# Patient Record
Sex: FEMALE | Race: WHITE | NOT HISPANIC OR LATINO | Employment: FULL TIME | URBAN - METROPOLITAN AREA
[De-identification: names, ages, dates, MRNs, and addresses within clinical notes are randomized per-mention and may not be internally consistent; named-entity substitution may affect disease eponyms.]

---

## 2019-10-24 LAB
CHOL/HDLC RATIO: 2.9
CHOLEST SERPL-MSCNC: 207 MG/DL (ref 0–200)
HDLC SERPL-MCNC: 71 MG/DL
HIV ANTIGEN/ANTIBODY: NON REACTIVE
LDLC SERPL CALC-MCNC: 119 MG/DL
NON-HDL CHOLESTEROL: 136
TRIGLYCERIDE (LIPID PAN): 73

## 2019-11-06 DIAGNOSIS — G43.909 MIGRAINES: Primary | ICD-10-CM

## 2020-06-08 ENCOUNTER — OFFICE VISIT (OUTPATIENT)
Dept: OBSTETRICS AND GYNECOLOGY | Facility: CLINIC | Age: 25
End: 2020-06-08
Attending: OBSTETRICS & GYNECOLOGY
Payer: COMMERCIAL

## 2020-06-08 VITALS
SYSTOLIC BLOOD PRESSURE: 100 MMHG | HEIGHT: 66 IN | DIASTOLIC BLOOD PRESSURE: 60 MMHG | BODY MASS INDEX: 28.34 KG/M2 | WEIGHT: 176.38 LBS

## 2020-06-08 DIAGNOSIS — Z01.419 WELL FEMALE EXAM WITH ROUTINE GYNECOLOGICAL EXAM: Primary | ICD-10-CM

## 2020-06-08 PROCEDURE — 99385 PR PREVENTIVE VISIT,NEW,18-39: ICD-10-PCS | Mod: S$GLB,,, | Performed by: OBSTETRICS & GYNECOLOGY

## 2020-06-08 PROCEDURE — 99999 PR PBB SHADOW E&M-EST. PATIENT-LVL III: ICD-10-PCS | Mod: PBBFAC,,, | Performed by: OBSTETRICS & GYNECOLOGY

## 2020-06-08 PROCEDURE — 99999 PR PBB SHADOW E&M-EST. PATIENT-LVL III: CPT | Mod: PBBFAC,,, | Performed by: OBSTETRICS & GYNECOLOGY

## 2020-06-08 PROCEDURE — 99385 PREV VISIT NEW AGE 18-39: CPT | Mod: S$GLB,,, | Performed by: OBSTETRICS & GYNECOLOGY

## 2020-06-08 PROCEDURE — 88175 CYTOPATH C/V AUTO FLUID REDO: CPT

## 2020-06-08 RX ORDER — ESCITALOPRAM OXALATE 20 MG/1
20 TABLET ORAL DAILY
Qty: 30 TABLET | Refills: 1 | Status: SHIPPED | OUTPATIENT
Start: 2020-06-08 | End: 2020-07-15 | Stop reason: SDUPTHER

## 2020-06-08 RX ORDER — ESCITALOPRAM OXALATE 10 MG/1
10 TABLET ORAL DAILY
COMMUNITY
End: 2020-06-08

## 2020-06-08 NOTE — PROGRESS NOTES
SUBJECTIVE:   24 y.o. female   for routine gyn exam. Patient's last menstrual period was 2020 (approximate)..  She has been on lexapro 10 mg for anxiety, but she would like to try a higher dose.         Past Medical History:   Diagnosis Date    Anxiety     Depression      Past Surgical History:   Procedure Laterality Date    NO PAST SURGERIES       Social History     Socioeconomic History    Marital status: Single     Spouse name: Not on file    Number of children: Not on file    Years of education: Not on file    Highest education level: Not on file   Occupational History    Not on file   Social Needs    Financial resource strain: Not on file    Food insecurity:     Worry: Not on file     Inability: Not on file    Transportation needs:     Medical: Not on file     Non-medical: Not on file   Tobacco Use    Smoking status: Never Smoker    Smokeless tobacco: Never Used   Substance and Sexual Activity    Alcohol use: Yes     Comment: social    Drug use: Never    Sexual activity: Not Currently     Birth control/protection: None   Lifestyle    Physical activity:     Days per week: Not on file     Minutes per session: Not on file    Stress: Not on file   Relationships    Social connections:     Talks on phone: Not on file     Gets together: Not on file     Attends Muslim service: Not on file     Active member of club or organization: Not on file     Attends meetings of clubs or organizations: Not on file     Relationship status: Not on file   Other Topics Concern    Not on file   Social History Narrative    Not on file     Family History   Problem Relation Age of Onset    Breast cancer Neg Hx     Colon cancer Neg Hx     Ovarian cancer Neg Hx      OB History    Para Term  AB Living   0 0 0 0 0 0   SAB TAB Ectopic Multiple Live Births   0 0 0 0 0         Current Outpatient Medications   Medication Sig Dispense Refill    escitalopram oxalate (LEXAPRO) 20 MG tablet Take 1  "tablet (20 mg total) by mouth once daily. 30 tablet 1     No current facility-administered medications for this visit.      Allergies: Amoxicillin; Keflex [cephalexin]; Pertussis vaccines; and Sulfa (sulfonamide antibiotics)     ROS:  Constitutional: no weight loss, weight gain, fever, fatigue  Eyes:  No vision changes, glasses/contacts  ENT/Mouth: No ulcers, sinus problems, ears ringing, headache  Cardiovascular: No inability to lie flat, chest pain, exercise intolerance, swelling, heart palpitations  Respiratory: No wheezing, coughing blood, shortness of breath, or cough  Gastrointestinal: No diarrhea, bloody stool, nausea/vomiting, constipation, gas, hemorrhoids  Genitourinary: No blood in urine, painful urination, urgency of urination, frequency of urination, incomplete emptying, incontinence, abnormal bleeding, painful periods, heavy periods, vaginal discharge, vaginal odor, painful intercourse, sexual problems, bleeding after intercourse.  Musculoskeletal: No muscle weakness  Skin/Breast: No painful breasts, nipple discharge, masses, rash, ulcers  Neurological: No passing out, seizures, numbness, headache  Endocrine: No diabetes, hypothyroid, hyperthyroid, hot flashes, hair loss, abnormal hair growth, acne  Psychiatric: No depression, crying  Hematologic: No bruises, bleeding, swollen lymph nodes, anemia.      OBJECTIVE:   The patient appears well, alert, oriented x 3, in no distress.  /60 (BP Location: Right arm, Patient Position: Sitting, BP Method: Medium (Manual))   Ht 5' 6" (1.676 m)   Wt 80 kg (176 lb 5.9 oz)   LMP 06/03/2020 (Approximate)   BMI 28.47 kg/m²   NECK: no thyromegaly, trachea midline  SKIN: no acne, striae, hirsutism  CHEST: CTAB  CV: RRR  BREAST EXAM: breasts appear normal, no suspicious masses, no skin or nipple changes or axillary nodes  ABDOMEN: no hernias, masses, or hepatosplenomegaly  GENITALIA: normal external genitalia, no erythema, no discharge  URETHRA: normal urethra, " normal urethral meatus  VAGINA: Normal  CERVIX: no lesions or cervical motion tenderness  UTERUS: normal size, contour, position, consistency, mobility, non-tender  ADNEXA: no mass, fullness, tenderness      ASSESSMENT:   1. Well female exam with routine gynecological exam  Liquid-Based Pap Smear, Screening       PLAN:   Orders Placed This Encounter    Liquid-Based Pap Smear, Screening    escitalopram oxalate (LEXAPRO) 20 MG tablet     Discussed healthy lifestyle including regular exercise, healthy eating, etc.  Return to clinic in 1 year

## 2020-06-12 LAB
FINAL PATHOLOGIC DIAGNOSIS: NORMAL
Lab: NORMAL

## 2020-07-15 ENCOUNTER — TELEPHONE (OUTPATIENT)
Dept: OBSTETRICS AND GYNECOLOGY | Facility: CLINIC | Age: 25
End: 2020-07-15

## 2020-07-15 RX ORDER — ESCITALOPRAM OXALATE 20 MG/1
20 TABLET ORAL DAILY
Qty: 90 TABLET | Refills: 1 | Status: SHIPPED | OUTPATIENT
Start: 2020-07-15 | End: 2020-07-16 | Stop reason: SDUPTHER

## 2020-07-15 NOTE — TELEPHONE ENCOUNTER
----- Message from Nahomi Roy MA sent at 7/15/2020  4:49 PM CDT -----  Patient request additional Lexapro refills until she can see her PCC  01/8/2021    Nahomi  ----- Message -----  From: Rancho Singer  Sent: 7/15/2020   4:35 PM CDT  To: Horace MARKS Staff    Patient is calling in to speak w/ staff on behalf of a refill patient contact number [phone]

## 2020-07-16 ENCOUNTER — OFFICE VISIT (OUTPATIENT)
Dept: FAMILY MEDICINE | Facility: CLINIC | Age: 25
End: 2020-07-16
Payer: COMMERCIAL

## 2020-07-16 DIAGNOSIS — G43.009 MIGRAINE WITHOUT AURA AND WITHOUT STATUS MIGRAINOSUS, NOT INTRACTABLE: ICD-10-CM

## 2020-07-16 DIAGNOSIS — F41.9 ANXIETY AND DEPRESSION: Primary | ICD-10-CM

## 2020-07-16 DIAGNOSIS — F32.A ANXIETY AND DEPRESSION: Primary | ICD-10-CM

## 2020-07-16 PROCEDURE — 99203 PR OFFICE/OUTPT VISIT, NEW, LEVL III, 30-44 MIN: ICD-10-PCS | Mod: 95,,, | Performed by: INTERNAL MEDICINE

## 2020-07-16 PROCEDURE — 99203 OFFICE O/P NEW LOW 30 MIN: CPT | Mod: 95,,, | Performed by: INTERNAL MEDICINE

## 2020-07-16 RX ORDER — ESCITALOPRAM OXALATE 20 MG/1
20 TABLET ORAL DAILY
Qty: 90 TABLET | Refills: 3 | Status: SHIPPED | OUTPATIENT
Start: 2020-07-16 | End: 2021-07-30

## 2020-07-16 RX ORDER — SUMATRIPTAN 50 MG/1
TABLET, FILM COATED ORAL
Qty: 15 TABLET | Refills: 0 | Status: SHIPPED | OUTPATIENT
Start: 2020-07-16 | End: 2020-08-04

## 2020-07-16 NOTE — PROGRESS NOTES
The patient location is: [place]  The chief complaint leading to consultation is: anxiety    Visit type: audiovisual    Face to Face time with patient: 10  10 minutes of total time spent on the encounter, which includes face to face time and non-face to face time preparing to see the patient (eg, review of tests), Obtaining and/or reviewing separately obtained history, Documenting clinical information in the electronic or other health record, Independently interpreting results (not separately reported) and communicating results to the patient/family/caregiver, or Care coordination (not separately reported).         Each patient to whom he or she provides medical services by telemedicine is:  (1) informed of the relationship between the physician and patient and the respective role of any other health care provider with respect to management of the patient; and (2) notified that he or she may decline to receive medical services by telemedicine and may withdraw from such care at any time.    Notes:     [facility] Note    Chief Complaint    No chief complaint on file.    History of Present Illness      Anabel Frost is a 24 y.o. female who presents today for chief complaint est care and med refills. Patient is new to me.    PCP: Alberto Lilly MD  Patient comes to appointment telemed.   Sees gyn Alicia Vu   Active Problem List with Overview Notes    Diagnosis Date Noted    Migraine without aura and without status migrainosus, not intractable 07/16/2020     Usually monthly, photophobia and nausea   imitrex is abortive       Anxiety and depression 07/16/2020     Has been on ssri for 3 years, recently increased to 20 mg, better controlled          Answers for HPI/ROS submitted by the patient on 7/16/2020   activity change: No  unexpected weight change: No  neck pain: No  hearing loss: No  rhinorrhea: No  trouble swallowing: No  eye discharge: No  visual disturbance: No  chest tightness:  No  wheezing: No  chest pain: No  palpitations: No  blood in stool: No  constipation: No  vomiting: No  diarrhea: No  polydipsia: No  polyuria: No  difficulty urinating: No  hematuria: No  menstrual problem: No  dysuria: No  joint swelling: No  arthralgias: No  headaches: No  weakness: No  confusion: No  dysphoric mood: No  activity change: No  unexpected weight change: No  rhinorrhea: No  trouble swallowing: No  visual disturbance: No  chest tightness: No  polyuria: No  difficulty urinating: No  menstrual problem: No  joint swelling: No  arthralgias: No  confusion: No  dysphoric mood: No       Health Maintenance   Topic Date Due    Lipid Panel  1995    HPV Vaccines (1 - 2-dose series) 11/28/2006    TETANUS VACCINE  11/28/2013    Pap Smear  06/08/2023       Past Medical History:   Diagnosis Date    Anxiety     Depression        Past Surgical History:   Procedure Laterality Date    NO PAST SURGERIES         family history is not on file.    Social History     Tobacco Use    Smoking status: Never Smoker    Smokeless tobacco: Never Used   Substance Use Topics    Alcohol use: Yes     Comment: social    Drug use: Never       Review of Systems   Constitutional: Negative for chills and fever.   HENT: Negative for congestion, hearing loss and sore throat.    Eyes: Negative for blurred vision and discharge.   Respiratory: Negative for cough, shortness of breath and wheezing.    Cardiovascular: Negative for chest pain and palpitations.   Gastrointestinal: Negative for blood in stool, constipation, diarrhea, nausea and vomiting.   Genitourinary: Negative for dysuria and hematuria.   Musculoskeletal: Negative for falls, myalgias and neck pain.   Skin: Negative for itching and rash.   Neurological: Negative for dizziness, weakness and headaches.   Endo/Heme/Allergies: Negative for polydipsia.        Outpatient Encounter Medications as of 7/16/2020   Medication Sig Dispense Refill    escitalopram oxalate  (LEXAPRO) 20 MG tablet Take 1 tablet (20 mg total) by mouth once daily. 90 tablet 3    sumatriptan (IMITREX) 50 MG tablet 1 po at start of headache may refill after 2 hours 15 tablet 0    [DISCONTINUED] escitalopram oxalate (LEXAPRO) 20 MG tablet Take 1 tablet (20 mg total) by mouth once daily. 90 tablet 1     No facility-administered encounter medications on file as of 7/16/2020.         Review of patient's allergies indicates:   Allergen Reactions    Amoxicillin Other (See Comments)    Keflex [cephalexin] Other (See Comments)    Pertussis vaccines Other (See Comments)    Sulfa (sulfonamide antibiotics) Other (See Comments)           Physical Exam       ]    Physical Exam  Vitals signs reviewed.   Constitutional:       General: She is not in acute distress.     Appearance: She is well-developed. She is not diaphoretic.   HENT:      Head: Normocephalic and atraumatic.      Right Ear: External ear normal.      Left Ear: External ear normal.      Nose: Nose normal.   Eyes:      General:         Right eye: No discharge.         Left eye: No discharge.      Conjunctiva/sclera: Conjunctivae normal.   Neck:      Musculoskeletal: Normal range of motion.   Pulmonary:      Effort: Pulmonary effort is normal. No respiratory distress.   Musculoskeletal: Normal range of motion.   Skin:     Coloration: Skin is not pale.      Findings: No rash.   Neurological:      Mental Status: She is alert and oriented to person, place, and time.   Psychiatric:         Behavior: Behavior normal.         Thought Content: Thought content normal.         Judgment: Judgment normal.          Laboratory:  CBC:  No results for input(s): WBC, RBC, HGB, HCT, PLT, MCV, MCH, MCHC in the last 2160 hours.  CMP:  No results for input(s): GLU, CALCIUM, ALBUMIN, PROT, NA, K, CO2, CL, BUN, ALKPHOS, ALT, AST, BILITOT in the last 2160 hours.    Invalid input(s): CREATININ  URINALYSIS:  No results for input(s): COLORU, CLARITYU, SPECGRAV, PHUR, PROTEINUA,  GLUCOSEU, BILIRUBINCON, BLOODU, WBCU, RBCU, BACTERIA, MUCUS, NITRITE, LEUKOCYTESUR, UROBILINOGEN, HYALINECASTS in the last 2160 hours.   LIPIDS:  No results for input(s): TSH, HDL, CHOL, TRIG, LDLCALC, CHOLHDL, NONHDLCHOL, TOTALCHOLEST in the last 2160 hours.  TSH:  No results for input(s): TSH in the last 2160 hours.  A1C:  No results for input(s): HGBA1C in the last 2160 hours.    Radiology:      Assessment/Plan     Anabel Frost is a 24 y.o.female with:    Migraine without aura and without status migrainosus, not intractable  Refill     Anxiety and depression  Refill lexapro       No orders of the defined types were placed in this encounter.      -Continue current medications and maintain follow up with specialists.  Return to clinic in as scheduled months.  Future Appointments   Date Time Provider Department Center   1/8/2021 11:00 AM Lady Pitt MD [facility]       Romina Juares MD  7/16/2020 3:57 PM    Primary Care Internal Medicine - [facility]                Answers for HPI/ROS submitted by the patient on 7/16/2020

## 2020-09-24 ENCOUNTER — PATIENT OUTREACH (OUTPATIENT)
Dept: ADMINISTRATIVE | Facility: HOSPITAL | Age: 25
End: 2020-09-24

## 2020-09-24 ENCOUNTER — TELEPHONE (OUTPATIENT)
Dept: ADMINISTRATIVE | Facility: HOSPITAL | Age: 25
End: 2020-09-24

## 2020-10-09 DIAGNOSIS — G43.009 MIGRAINE WITHOUT AURA AND WITHOUT STATUS MIGRAINOSUS, NOT INTRACTABLE: ICD-10-CM

## 2020-10-09 RX ORDER — SUMATRIPTAN 50 MG/1
TABLET, FILM COATED ORAL
Qty: 9 TABLET | Refills: 1 | Status: SHIPPED | OUTPATIENT
Start: 2020-10-09 | End: 2021-01-31 | Stop reason: SDUPTHER

## 2020-10-15 ENCOUNTER — OFFICE VISIT (OUTPATIENT)
Dept: FAMILY MEDICINE | Facility: CLINIC | Age: 25
End: 2020-10-15
Payer: COMMERCIAL

## 2020-10-15 VITALS
HEART RATE: 92 BPM | WEIGHT: 186.19 LBS | HEIGHT: 66 IN | DIASTOLIC BLOOD PRESSURE: 82 MMHG | TEMPERATURE: 98 F | OXYGEN SATURATION: 97 % | BODY MASS INDEX: 29.92 KG/M2 | SYSTOLIC BLOOD PRESSURE: 124 MMHG | RESPIRATION RATE: 16 BRPM

## 2020-10-15 DIAGNOSIS — E01.0 THYROMEGALY: ICD-10-CM

## 2020-10-15 DIAGNOSIS — F32.A ANXIETY AND DEPRESSION: ICD-10-CM

## 2020-10-15 DIAGNOSIS — F41.9 ANXIETY AND DEPRESSION: ICD-10-CM

## 2020-10-15 DIAGNOSIS — Z11.59 ENCOUNTER FOR HEPATITIS C SCREENING TEST FOR LOW RISK PATIENT: Primary | ICD-10-CM

## 2020-10-15 DIAGNOSIS — R53.83 FATIGUE, UNSPECIFIED TYPE: ICD-10-CM

## 2020-10-15 DIAGNOSIS — G47.33 OBSTRUCTIVE SLEEP APNEA: ICD-10-CM

## 2020-10-15 PROCEDURE — 99214 OFFICE O/P EST MOD 30 MIN: CPT | Mod: S$GLB,,, | Performed by: INTERNAL MEDICINE

## 2020-10-15 PROCEDURE — 3008F PR BODY MASS INDEX (BMI) DOCUMENTED: ICD-10-PCS | Mod: CPTII,S$GLB,, | Performed by: INTERNAL MEDICINE

## 2020-10-15 PROCEDURE — 3008F BODY MASS INDEX DOCD: CPT | Mod: CPTII,S$GLB,, | Performed by: INTERNAL MEDICINE

## 2020-10-15 PROCEDURE — 99214 PR OFFICE/OUTPT VISIT, EST, LEVL IV, 30-39 MIN: ICD-10-PCS | Mod: S$GLB,,, | Performed by: INTERNAL MEDICINE

## 2020-10-15 PROCEDURE — 99999 PR PBB SHADOW E&M-EST. PATIENT-LVL III: ICD-10-PCS | Mod: PBBFAC,,, | Performed by: INTERNAL MEDICINE

## 2020-10-15 PROCEDURE — 99999 PR PBB SHADOW E&M-EST. PATIENT-LVL III: CPT | Mod: PBBFAC,,, | Performed by: INTERNAL MEDICINE

## 2020-10-15 RX ORDER — ESCITALOPRAM OXALATE 10 MG/1
TABLET ORAL
COMMUNITY
End: 2020-10-15

## 2020-10-15 NOTE — PROGRESS NOTES
"[facility] Note    Chief Complaint      Chief Complaint   Patient presents with    Follow-up     History of Present Illness      Anabel Frost is a 24 y.o. female who presents today for chief complaint follow up anxiety .      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 No Le edema, no exertional dyspnea   Feels like mood is better on increased dose prozac but just feeling really tired and run down, having irregular periods,   Active Problem List with Overview Notes    Diagnosis Date Noted    Fatigue 10/15/2020    Obstructive sleep apnea 10/15/2020    Migraine without aura and without status migrainosus, not intractable 07/16/2020     Usually monthly, photophobia and nausea   imitrex is abortive       Anxiety and depression 07/16/2020     Has been on ssri for 3 years, recently increased to 20 mg, better controlled          Health Maintenance   Topic Date Due    Hepatitis C Screening  1995    HPV Vaccines (1 - 2-dose series) 11/28/2006    TETANUS VACCINE  11/28/2013    Pap Smear  06/08/2023    Lipid Panel  Completed       Past Medical History:   Diagnosis Date    Anxiety     Depression        Past Surgical History:   Procedure Laterality Date    NO PAST SURGERIES         family history is not on file.    Social History     Tobacco Use    Smoking status: Never Smoker    Smokeless tobacco: Never Used   Substance Use Topics    Alcohol use: Yes     Frequency: Monthly or less     Drinks per session: 1 or 2     Binge frequency: Less than monthly     Comment: social    Drug use: Never       Review of Systems   Constitutional: Positive for malaise/fatigue. Negative for chills and fever.   HENT: " "Negative for congestion and sore throat.    Eyes: Negative for blurred vision and discharge.   Respiratory: Negative for cough and shortness of breath.    Cardiovascular: Negative for chest pain, palpitations, orthopnea and leg swelling.   Gastrointestinal: Negative for constipation, diarrhea, nausea and vomiting.   Genitourinary: Negative for dysuria and hematuria.   Musculoskeletal: Negative for falls and myalgias.   Skin: Negative for itching and rash.   Neurological: Negative for dizziness and headaches.        Outpatient Encounter Medications as of 10/15/2020   Medication Sig Dispense Refill    escitalopram oxalate (LEXAPRO) 20 MG tablet Take 1 tablet (20 mg total) by mouth once daily. 90 tablet 3    sumatriptan (IMITREX) 50 MG tablet TAKE 1 TABLET BY MOUTH AT START OF HEADACHE MAY REPEAT AFTER 2 HOURS 9 tablet 1    [DISCONTINUED] escitalopram oxalate (LEXAPRO) 10 MG tablet        No facility-administered encounter medications on file as of 10/15/2020.         Review of patient's allergies indicates:   Allergen Reactions    Amoxicillin Other (See Comments)    Keflex [cephalexin] Other (See Comments)    Pertussis vaccines Other (See Comments)    Sulfa (sulfonamide antibiotics) Other (See Comments)           Physical Exam      Vital Signs  Temp: 98.1 °F (36.7 °C)  Temp src: Oral  Pulse: 92  Resp: 16  SpO2: 97 %  BP: (!) 120/90  BP Location: Right arm  Patient Position: Sitting  Pain Score: 0-No pain  Height and Weight  Height: 5' 6" (167.6 cm)  Weight: 84.5 kg (186 lb 2.9 oz)  BSA (Calculated - sq m): 1.98 sq meters  BMI (Calculated): 30.1  Weight in (lb) to have BMI = 25: 154.6]    Physical Exam  Vitals signs reviewed.   Constitutional:       Appearance: She is well-developed.   HENT:      Head: Normocephalic and atraumatic.      Right Ear: External ear normal.      Left Ear: External ear normal.   Eyes:      General:         Right eye: No discharge.         Left eye: No discharge.   Neck:      " Musculoskeletal: Normal range of motion.      Thyroid: Thyromegaly present.   Cardiovascular:      Rate and Rhythm: Normal rate and regular rhythm.      Heart sounds: Normal heart sounds. No murmur.   Pulmonary:      Effort: Pulmonary effort is normal. No respiratory distress.      Breath sounds: Normal breath sounds.   Abdominal:      General: Bowel sounds are normal. There is no distension.      Palpations: Abdomen is soft.      Tenderness: There is no abdominal tenderness.   Musculoskeletal: Normal range of motion.         General: No deformity.   Lymphadenopathy:      Cervical: No cervical adenopathy.   Skin:     General: Skin is warm and dry.      Findings: No rash.   Neurological:      Mental Status: She is alert and oriented to person, place, and time.   Psychiatric:         Behavior: Behavior normal.              Assessment/Plan     Anabel Frost is a 24 y.o.female with:    No problem-specific Assessment & Plan notes found for this encounter.      Orders Placed This Encounter   Procedures    US Soft Tissue Head Neck Thyroid     Standing Status:   Future     Standing Expiration Date:   10/15/2021     Order Specific Question:   May the Radiologist modify the order per protocol to meet the clinical needs of the patient?     Answer:   Yes    CBC auto differential     Standing Status:   Future     Standing Expiration Date:   12/14/2021    Comprehensive Metabolic Panel     Standing Status:   Future     Standing Expiration Date:   12/14/2021    T4, Free     Standing Status:   Future     Standing Expiration Date:   12/15/2021    TSH     Standing Status:   Future     Standing Expiration Date:   12/15/2021    Iron and TIBC     Standing Status:   Future     Standing Expiration Date:   12/14/2021    Ferritin     Standing Status:   Future     Standing Expiration Date:   12/14/2021    Hepatitis C Antibody     Standing Status:   Future     Standing Expiration Date:   12/14/2021    Polysomnography 4 or more  parameters     Standing Status:   Future     Standing Expiration Date:   10/15/2021       Use of the [facility] Patient Portal discussed and encouraged during today's visit  -Continue current medications and maintain follow up with specialists.  Return to clinic in 6 weeks  No future appointments.    Romina Juares MD  10/15/2020 1:17 PM    Primary Care Internal Medicine - [facility]

## 2020-10-17 ENCOUNTER — PATIENT MESSAGE (OUTPATIENT)
Dept: FAMILY MEDICINE | Facility: CLINIC | Age: 25
End: 2020-10-17

## 2020-10-17 DIAGNOSIS — D50.0 IRON DEFICIENCY ANEMIA DUE TO CHRONIC BLOOD LOSS: Primary | ICD-10-CM

## 2020-10-19 ENCOUNTER — TELEPHONE (OUTPATIENT)
Dept: INTERNAL MEDICINE | Facility: CLINIC | Age: 25
End: 2020-10-19

## 2020-10-20 ENCOUNTER — PATIENT MESSAGE (OUTPATIENT)
Dept: FAMILY MEDICINE | Facility: CLINIC | Age: 25
End: 2020-10-20

## 2020-10-20 ENCOUNTER — TELEPHONE (OUTPATIENT)
Dept: FAMILY MEDICINE | Facility: CLINIC | Age: 25
End: 2020-10-20

## 2020-10-20 ENCOUNTER — PATIENT OUTREACH (OUTPATIENT)
Dept: ADMINISTRATIVE | Facility: OTHER | Age: 25
End: 2020-10-20

## 2020-10-20 NOTE — TELEPHONE ENCOUNTER
----- Message from Mauricio Swain sent at 10/20/2020  3:46 PM CDT -----  Type:  Patient Returning Call    Who Called: Anabel  Who Left Message for Patient: Lexie  Does the patient know what this is regarding?: schedule hem/onc  Would the patient rather a call back or a response via [facility]? Call back  Best Call Back Number: [phone]  Additional Information: none

## 2020-10-21 ENCOUNTER — PATIENT MESSAGE (OUTPATIENT)
Dept: OBSTETRICS AND GYNECOLOGY | Facility: CLINIC | Age: 25
End: 2020-10-21

## 2020-10-21 NOTE — PROGRESS NOTES
Health Maintenance Due   Topic Date Due    HPV Vaccines (1 - 2-dose series) 11/28/2006    TETANUS VACCINE  11/28/2013     Updates were requested from care everywhere.  Chart was reviewed for overdue Proactive [facility] Encounters ([facility]) topics (CRS, Breast Cancer Screening, Eye exam)  Health Maintenance has been updated.  LINKS immunization registry triggered.  Immunizations were reconciled.  LINKS not responding

## 2020-10-21 NOTE — PROGRESS NOTES
Health Maintenance Due   Topic Date Due    HPV Vaccines (1 - 2-dose series) 11/28/2006    TETANUS VACCINE  11/28/2013     Updates were requested from care everywhere.  Chart was reviewed for overdue [facility]active [facility] Encounters ([facility]) topics (CRS, Breast Cancer Screening, Eye exam)  Health Maintenance has been updated.  LINKS immunization registry triggered.  Immunizations were reconciled.'  LINKS is not responding

## 2020-10-22 ENCOUNTER — OFFICE VISIT (OUTPATIENT)
Dept: SLEEP MEDICINE | Facility: CLINIC | Age: 25
End: 2020-10-22
Payer: COMMERCIAL

## 2020-10-22 ENCOUNTER — PATIENT MESSAGE (OUTPATIENT)
Dept: FAMILY MEDICINE | Facility: CLINIC | Age: 25
End: 2020-10-22

## 2020-10-22 DIAGNOSIS — G47.33 OSA (OBSTRUCTIVE SLEEP APNEA): Primary | ICD-10-CM

## 2020-10-22 PROCEDURE — 99202 PR OFFICE/OUTPT VISIT, NEW, LEVL II, 15-29 MIN: ICD-10-PCS | Mod: 95,,, | Performed by: INTERNAL MEDICINE

## 2020-10-22 PROCEDURE — 99202 OFFICE O/P NEW SF 15 MIN: CPT | Mod: 95,,, | Performed by: INTERNAL MEDICINE

## 2020-10-22 NOTE — LETTER
October 22, 2020      Romina Juares MD  [id number] [facility] [address]           [id number]  [address]  [facility] [address]  Phone: [phone]          Patient: Anabel Frost   MR Number: [id number]   YOB: 1995   Date of Visit: 10/22/2020       Dear Dr. Romina Juares:    Thank you for referring Anabel Frost to me for evaluation. Attached you will find relevant portions of my assessment and plan of care.    If you have questions, please do not hesitate to call me. I look forward to following Anabel Frost along with you.    Sincerely,    Kristy Mckeon MD    Enclosure  CC:  No Recipients    If you would like to receive this communication electronically, please contact [email or web address] or [phone] to request more information on [employer] Link access.    For providers and/or their staff who would like to refer a patient to [facility], please contact us through our one-stop-shop provider referral line, [facility], at [phone].    If you feel you have received this communication in error or would no longer like to receive these types of communications, please e-mail [email or web address]

## 2020-10-22 NOTE — PROGRESS NOTES
Subjective:       Patient ID: Anabel Frost is a 24 y.o. female.    Chief Complaint: Sleep Apnea    HPI     I had the pleasure of seeing Anabel Frost today, who is a 24 y.o. female that presents with Obstructive Sleep Apnea.    Anabel Frost does not have a CDL.    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    Anabel Frost presents with FROY that has been going on for 2 years   I do not have copy of study.   She was told it was mild and did not need therapy.   Symptoms have worsened.       Bedtime when working ranges from 2230 to 2300.   When not working, bedtime ranges from 2230 to 2300.   Sleep latency ranges from 10 to 20 minutes.     Average number of awakenings is 0-2 and return to sleep is quick.   Wake up time when working is 0645 to 0645.   When not working, wake up time is 0800 to 0830.   Patient does not feel rested upon awakening.    Anabel Frost consumes approximately 2 beverages with caffeine daily.   An average of 2 beverages with alcohol are consumed weekly   Medications taken for sleep currently: none  Previous medications taken: none     Anabel Frost does experience daytime sleepiness.   Naps are taken about 0 times weekly, usually lasting NA to NA minutes.  Anabel currently does operate an automobile.  Anabel Frost does not experience drowsiness when driving.   Patient does doze off when sedentary.   Anabel Frost does not have auxiliary symptoms of narcolepsy including sleep onset paralysis, hypnagogic hallucinations, sleep attacks and cataplexy  ESS.    Anabel Frost has a history of snoring.   Snoring is described as mild and intermittent.   Apneic episodes have been noticed during sleep.   A witness to sleep is not present.   The patient awakens with headaches.      Anabel Frost does not have symptoms of Restless Legs Syndrome. Nocturnal leg movements have not been noticed.   The patient does not experience sleep related leg cramps.   There is not a history of parasomnia     Review of patient's  allergies indicates:   Allergen Reactions    Amoxicillin Other (See Comments)    Keflex [cephalexin] Other (See Comments)    Pertussis vaccines Other (See Comments)    Sulfa (sulfonamide antibiotics) Other (See Comments)         Past Medical History:   Diagnosis Date    Anxiety     Depression        Past Surgical History:   Procedure Laterality Date    NO PAST SURGERIES         Family History   Problem Relation Age of Onset    Breast cancer Neg Hx     Colon cancer Neg Hx     Ovarian cancer Neg Hx        Social History     Socioeconomic History    Marital status: Single     Spouse name: Not on file    Number of children: Not on file    Years of education: Not on file    Highest education level: Not on file   Occupational History    Not on file   Social Needs    Financial resource strain: Not very hard    Food insecurity     Worry: Never true     Inability: Never true    Transportation needs     Medical: No     Non-medical: No   Tobacco Use    Smoking status: Never Smoker    Smokeless tobacco: Never Used   Substance and Sexual Activity    Alcohol use: Yes     Frequency: Monthly or less     Drinks per session: 1 or 2     Binge frequency: Less than monthly     Comment: social    Drug use: Never    Sexual activity: Not Currently     Birth control/protection: None   Lifestyle    Physical activity     Days per week: 1 day     Minutes per session: 30 min    Stress: To some extent   Relationships    Social connections     Talks on phone: Twice a week     Gets together: Twice a week     Attends Yazdanism service: Not on file     Active member of club or organization: No     Attends meetings of clubs or organizations: Never     Relationship status: Never    Other Topics Concern    Not on file   Social History Narrative    Not on file           Old medical records.    There were no vitals filed for this visit.           The patient was given open opportunity to ask questions and/or express  concerns about treatment plan.   All questions/concerns were discussed.   Driving precautions were provided.     Two patient identifiers used prior to evaluation.    Thank you for referring Anabel Frost for evaluation.           Review of Systems      Objective:      Physical Exam    Assessment:       1. FROY (obstructive sleep apnea)        Plan:       Due to listed symptoms, a home sleep study is recommended and ordered.   Description of procedure given to patient.   If significant Obstructive Sleep Apnea (FROY) is found during the initial portion of the study, therapy will be initiated with nasal Continuous Positive Airway Pressure (CPAP).   Goals of therapy were discussed, alternative treatments listed and patient agrees to this form of therapy if indicated.   The pathophysiology of FROY was discussed.   The effects of FROY on patient's co-morbid conditions and the increased morbidity and/or mortality associated with this condition were reviewed.   The patient was given open opportunity to ask questions and/or express concerns about treatment plan.   All questions/concerns were discussed.   Driving precautions were provided.       Thank you for referring Anabel Frost for evaluation.       The patient location is: home  The chief complaint leading to consultation is: FROY    Visit type: audiovisual    Face to Face time with patient: 12  22 minutes of total time spent on the encounter, which includes face to face time and non-face to face time preparing to see the patient (eg, review of tests), Obtaining and/or reviewing separately obtained history, Documenting clinical information in the electronic or other health record, Independently interpreting results (not separately reported) and communicating results to the patient/family/caregiver, or Care coordination (not separately reported).         Each patient to whom he or she provides medical services by telemedicine is:  (1) informed of the relationship between the  physician and patient and the respective role of any other health care provider with respect to management of the patient; and (2) notified that he or she may decline to receive medical services by telemedicine and may withdraw from such care at any time.    Notes:

## 2020-10-23 ENCOUNTER — PATIENT MESSAGE (OUTPATIENT)
Dept: FAMILY MEDICINE | Facility: CLINIC | Age: 25
End: 2020-10-23

## 2020-10-23 ENCOUNTER — OFFICE VISIT (OUTPATIENT)
Dept: HEMATOLOGY/ONCOLOGY | Facility: CLINIC | Age: 25
End: 2020-10-23
Payer: COMMERCIAL

## 2020-10-23 VITALS
DIASTOLIC BLOOD PRESSURE: 77 MMHG | TEMPERATURE: 99 F | HEIGHT: 65 IN | WEIGHT: 187.19 LBS | SYSTOLIC BLOOD PRESSURE: 124 MMHG | OXYGEN SATURATION: 98 % | RESPIRATION RATE: 14 BRPM | HEART RATE: 77 BPM | BODY MASS INDEX: 31.19 KG/M2

## 2020-10-23 DIAGNOSIS — F32.A ANXIETY AND DEPRESSION: Primary | ICD-10-CM

## 2020-10-23 DIAGNOSIS — E61.1 DIETARY IRON DEFICIENCY: ICD-10-CM

## 2020-10-23 DIAGNOSIS — F41.9 ANXIETY AND DEPRESSION: Primary | ICD-10-CM

## 2020-10-23 DIAGNOSIS — Z82.49 FAMILY HISTORY OF HEREDITARY HEMORRHAGIC TELANGIECTASIA: ICD-10-CM

## 2020-10-23 DIAGNOSIS — R53.83 FATIGUE, UNSPECIFIED TYPE: ICD-10-CM

## 2020-10-23 DIAGNOSIS — Z87.42 HISTORY OF IRREGULAR MENSTRUAL CYCLES: ICD-10-CM

## 2020-10-23 DIAGNOSIS — D50.0 IRON DEFICIENCY ANEMIA DUE TO CHRONIC BLOOD LOSS: ICD-10-CM

## 2020-10-23 PROCEDURE — 99999 PR PBB SHADOW E&M-EST. PATIENT-LVL IV: ICD-10-PCS | Mod: PBBFAC,,, | Performed by: STUDENT IN AN ORGANIZED HEALTH CARE EDUCATION/TRAINING PROGRAM

## 2020-10-23 PROCEDURE — 3008F PR BODY MASS INDEX (BMI) DOCUMENTED: ICD-10-PCS | Mod: CPTII,S$GLB,, | Performed by: STUDENT IN AN ORGANIZED HEALTH CARE EDUCATION/TRAINING PROGRAM

## 2020-10-23 PROCEDURE — 99205 PR OFFICE/OUTPT VISIT, NEW, LEVL V, 60-74 MIN: ICD-10-PCS | Mod: S$GLB,,, | Performed by: STUDENT IN AN ORGANIZED HEALTH CARE EDUCATION/TRAINING PROGRAM

## 2020-10-23 PROCEDURE — 99205 OFFICE O/P NEW HI 60 MIN: CPT | Mod: S$GLB,,, | Performed by: STUDENT IN AN ORGANIZED HEALTH CARE EDUCATION/TRAINING PROGRAM

## 2020-10-23 PROCEDURE — 99999 PR PBB SHADOW E&M-EST. PATIENT-LVL IV: CPT | Mod: PBBFAC,,, | Performed by: STUDENT IN AN ORGANIZED HEALTH CARE EDUCATION/TRAINING PROGRAM

## 2020-10-23 PROCEDURE — 3008F BODY MASS INDEX DOCD: CPT | Mod: CPTII,S$GLB,, | Performed by: STUDENT IN AN ORGANIZED HEALTH CARE EDUCATION/TRAINING PROGRAM

## 2020-10-23 RX ORDER — HYDROXYZINE HYDROCHLORIDE 25 MG/1
25 TABLET, FILM COATED ORAL 4 TIMES DAILY PRN
Qty: 60 TABLET | Refills: 3 | Status: SHIPPED | OUTPATIENT
Start: 2020-10-23 | End: 2020-12-16

## 2020-10-23 NOTE — LETTER
October 23, 2020      Romina Juares MD  [id number] [facility] [id number]           [facility] [address], [address]  [facility] [address]  Phone: [phone]          Patient: Anabel Frost   MR Number: [id number]   YOB: 1995   Date of Visit: 10/23/2020       Dear Dr. Romina Juares:    Thank you for referring Anabel Frost to me for evaluation. Attached you will find relevant portions of my assessment and plan of care.    If you have questions, please do not hesitate to call me. I look forward to following Anabel Frost along with you.    Sincerely,    Char Nieves MD    Enclosure  CC:  No Recipients    If you would like to receive this communication electronically, please contact [email or web address] or [phone] to request more information on [employer] Link access.    For providers and/or their staff who would like to refer a patient to [facility], please contact us through our one-stop-shop provider referral line, [facility], at [phone].    If you feel you have received this communication in error or would no longer like to receive these types of communications, please e-mail [email or web address]

## 2020-10-23 NOTE — ASSESSMENT & PLAN NOTE
-patient endorses features of anxiety, depression and nightmares  -better with Lexapro 20  -advised patient to restart seeing a therapist  -Atarax 25 mg p.r.n. for anxiety

## 2020-10-23 NOTE — ASSESSMENT & PLAN NOTE
-labs reviewed, hemoglobin 13.5 normal  -iron profile reviewed, suggestive of iron deficiency  -Patient denies any bleeding.  Complains of irregular menstrual cycles  -patient advised to take oral iron ferrous sulfate 325 mg daily  -MiraLax p.r.n. for constipation  -return to clinic in 3 months with repeat labs.

## 2020-10-23 NOTE — PROGRESS NOTES
Hematology- Oncology Clinic Note :      10/23/2020    RFV / chief complaint- iron was low (here for tranfusion)        HPI  Pt is a 24 y.o. female who  has a past medical history of Anxiety and Depression.   Pt presents to the clinic today for evaluation and management of iron deficiency.    C/o fatigue  Onset of symptoms was gradual starting several months ago with gradually worsening course since that time. Symptoms are moderate in intensity.  Patient denies bleeding.  She has irregular menstrual cycles, at times with heavy bleeding.  Patient reports to be eating a regular healthy diet.    Patient reports that her depression and anxiety can be better controlled.  She experiences nightmares on a regular basis and has a heavy head in the morning.  She feels she does not get a good night rest.  She also feels anxious with palpitations during the day.  She has been taking Lexapro 20 daily.  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  Family lives in a different state.  Patient denies any smoking or illicit drug use.  Drinks a couple of times a month.  Family history-maternal grand father and maternal aunt with HHT and paternal grandfather with gastric cancer      Reviewed past medical/surgical/social history    Past Medical History:   Diagnosis Date    Anxiety     Depression       Past Surgical History:   Procedure Laterality Date    NO PAST SURGERIES        (Not in a hospital admission)    Review of patient's allergies indicates:   Allergen Reactions    Amoxicillin Other (See Comments)    Keflex [cephalexin] Other (See Comments)    Pertussis vaccines Other (See Comments)    Sulfa (sulfonamide antibiotics) Other (See Comments)      Social History     Tobacco Use    Smoking status: Never Smoker    Smokeless tobacco: Never Used  "  Substance Use Topics    Alcohol use: Yes     Frequency: Monthly or less     Drinks per session: 1 or 2     Binge frequency: Less than monthly     Comment: social      Family History   Problem Relation Age of Onset    Anemia Maternal Grandfather     Cancer Paternal Grandfather     Anemia Paternal Aunt     Breast cancer Neg Hx     Colon cancer Neg Hx     Ovarian cancer Neg Hx           Review of Systems :  Review of Systems   Constitutional: Positive for malaise/fatigue. Negative for chills, diaphoresis, fever and weight loss.   HENT: Negative.  Negative for congestion, hearing loss, nosebleeds, sore throat and tinnitus.    Eyes: Negative.  Negative for blurred vision and discharge.   Respiratory: Negative for cough, hemoptysis, sputum production, shortness of breath and wheezing.    Cardiovascular: Negative.  Negative for chest pain, palpitations and leg swelling.   Gastrointestinal: Negative.  Negative for abdominal pain, blood in stool, constipation, diarrhea, heartburn, melena, nausea and vomiting.   Genitourinary:        Irregular menstrual cycles   Musculoskeletal: Negative.  Negative for back pain, falls, joint pain and myalgias.   Skin: Negative.  Negative for itching and rash.   Neurological: Negative.  Negative for dizziness, tingling, sensory change, speech change, focal weakness, seizures, loss of consciousness, weakness and headaches.   Endo/Heme/Allergies: Negative.  Does not bruise/bleed easily.   Psychiatric/Behavioral: Positive for depression. The patient is nervous/anxious and has insomnia.                Physical Exam :  /77 (BP Location: Left arm, Patient Position: Sitting, BP Method: Medium (Automatic))   Pulse 77   Temp 98.7 °F (37.1 °C) (Oral)   Resp 14   Ht 5' 5.25" (1.657 m)   Wt 84.9 kg (187 lb 2.7 oz)   LMP 09/11/2020   SpO2 98%   BMI 30.91 kg/m²   Wt Readings from Last 3 Encounters:   10/23/20 84.9 kg (187 lb 2.7 oz)   10/15/20 84.5 kg (186 lb 2.9 oz)   06/08/20 80 kg " (176 lb 5.9 oz)       Body mass index is 30.91 kg/m².      Physical Exam  Vitals signs and nursing note reviewed.   Constitutional:       General: She is not in acute distress.     Appearance: She is well-developed.   HENT:      Head: Normocephalic and atraumatic.      Mouth/Throat:      Pharynx: No oropharyngeal exudate.   Eyes:      General: No scleral icterus.     Conjunctiva/sclera: Conjunctivae normal.   Neck:      Musculoskeletal: Normal range of motion and neck supple.      Thyroid: No thyromegaly.      Trachea: No tracheal deviation.   Cardiovascular:      Rate and Rhythm: Normal rate and regular rhythm.      Heart sounds: Normal heart sounds. No murmur. No friction rub.   Pulmonary:      Effort: Pulmonary effort is normal. No respiratory distress.      Breath sounds: Normal breath sounds. No wheezing or rales.   Abdominal:      General: Bowel sounds are normal. There is no distension.      Palpations: Abdomen is soft. There is no mass.      Tenderness: There is no abdominal tenderness. There is no rebound.   Musculoskeletal: Normal range of motion.         General: No tenderness.   Lymphadenopathy:      Cervical: No cervical adenopathy.   Skin:     General: Skin is warm.      Findings: No erythema or rash.   Neurological:      Mental Status: She is alert and oriented to person, place, and time.      Cranial Nerves: No cranial nerve deficit.   Psychiatric:         Mood and Affect: Mood is depressed.         Behavior: Behavior normal.             Current Outpatient Medications   Medication Sig Dispense Refill    escitalopram oxalate (LEXAPRO) 20 MG tablet Take 1 tablet (20 mg total) by mouth once daily. 90 tablet 3    hydrOXYzine HCL (ATARAX) 25 MG tablet Take 1 tablet (25 mg total) by mouth 4 (four) times daily as needed for Anxiety. 60 tablet 3    sumatriptan (IMITREX) 50 MG tablet TAKE 1 TABLET BY MOUTH AT START OF HEADACHE MAY REPEAT AFTER 2 HOURS 9 tablet 1     No current facility-administered  medications for this visit.        Pertinent Diagnostic studies:      No visits with results within 1 Week(s) from this visit.   Latest known visit with results is:   Lab Visit on 10/15/2020   Component Date Value Ref Range Status    WBC 10/15/2020 9.41  3.90 - 12.70 K/uL Final    RBC 10/15/2020 4.76  4.00 - 5.40 M/uL Final    Hemoglobin 10/15/2020 13.5  12.0 - 16.0 g/dL Final    Hematocrit 10/15/2020 42.8  37.0 - 48.5 % Final    Mean Corpuscular Volume 10/15/2020 90  82 - 98 fL Final    Mean Corpuscular Hemoglobin 10/15/2020 28.4  27.0 - 31.0 pg Final    Mean Corpuscular Hemoglobin Conc 10/15/2020 31.5* 32.0 - 36.0 g/dL Final    RDW 10/15/2020 12.9  11.5 - 14.5 % Final    Platelets 10/15/2020 311  150 - 350 K/uL Final    MPV 10/15/2020 10.7  9.2 - 12.9 fL Final    Immature Granulocytes 10/15/2020 0.5  0.0 - 0.5 % Final    Gran # (ANC) 10/15/2020 5.2  1.8 - 7.7 K/uL Final    Immature Grans (Abs) 10/15/2020 0.05* 0.00 - 0.04 K/uL Final    Comment: Mild elevation in immature granulocytes is non specific and   can be seen in a variety of conditions including stress response,   acute inflammation, trauma and pregnancy. Correlation with other   laboratory and clinical findings is essential.      Lymph # 10/15/2020 3.3  1.0 - 4.8 K/uL Final    Mono # 10/15/2020 0.7  0.3 - 1.0 K/uL Final    Eos # 10/15/2020 0.2  0.0 - 0.5 K/uL Final    Baso # 10/15/2020 0.08  0.00 - 0.20 K/uL Final    nRBC 10/15/2020 0  0 /100 WBC Final    Gran% 10/15/2020 55.2  38.0 - 73.0 % Final    Lymph% 10/15/2020 34.9  18.0 - 48.0 % Final    Mono% 10/15/2020 6.9  4.0 - 15.0 % Final    Eosinophil% 10/15/2020 1.6  0.0 - 8.0 % Final    Basophil% 10/15/2020 0.9  0.0 - 1.9 % Final    Differential Method 10/15/2020 Automated   Final    Sodium 10/15/2020 141  136 - 145 mmol/L Final    Potassium 10/15/2020 4.2  3.5 - 5.1 mmol/L Final    Chloride 10/15/2020 102  95 - 110 mmol/L Final    CO2 10/15/2020 30* 23 - 29 mmol/L Final     Glucose 10/15/2020 88  70 - 110 mg/dL Final    BUN, Bld 10/15/2020 13  7 - 17 mg/dL Final    Creatinine 10/15/2020 0.76  0.50 - 1.40 mg/dL Final    Calcium 10/15/2020 9.4  8.7 - 10.5 mg/dL Final    Total Protein 10/15/2020 8.5* 6.0 - 8.4 g/dL Final    Albumin 10/15/2020 4.7  3.5 - 5.2 g/dL Final    Total Bilirubin 10/15/2020 0.4  0.1 - 1.0 mg/dL Final    Comment: For infants and newborns, interpretation of results should be based  on gestational age, weight and in agreement with clinical  observations.  Premature Infant recommended reference ranges:  Up to 24 hours.............<8.0 mg/dL  Up to 48 hours............<12.0 mg/dL  3-5 days..................<15.0 mg/dL  6-29 days.................<15.0 mg/dL      Alkaline Phosphatase 10/15/2020 92  38 - 126 U/L Final    AST 10/15/2020 32  15 - 46 U/L Final    ALT 10/15/2020 23  10 - 44 U/L Final    Anion Gap 10/15/2020 9  8 - 16 mmol/L Final    eGFR if African American 10/15/2020 >60.0  >60 mL/min/1.73 m^2 Final    eGFR if non African American 10/15/2020 >60.0  >60 mL/min/1.73 m^2 Final    Comment: Calculation used to obtain the estimated glomerular filtration  rate (eGFR) is the CKD-EPI equation.       Free T4 10/15/2020 0.99  0.71 - 1.51 ng/dL Final    TSH 10/15/2020 1.630  0.400 - 4.000 uIU/mL Final    Comment: Warning:  Heterophilic antibodies in serum or plasma of   certain individuals are known to cause interference with   immunoassays. These antibodies may be present in blood samples   from individuals regularly exposed to animal or who have been   treated with animal products.   Patients taking high doses of supplemental biotin may have  negatively biased results.       Iron 10/15/2020 53  30 - 160 ug/dL Final    Transferrin 10/15/2020 341  200 - 375 mg/dL Final    TIBC 10/15/2020 505* 250 - 450 ug/dL Final    Saturated Iron 10/15/2020 10* 20 - 50 % Final    Ferritin 10/15/2020 10* 20.0 - 300.0 ng/mL Final    Hepatitis C Ab 10/15/2020 Negative   Negative Final         Patient Active Problem List    Diagnosis Date Noted    Dietary iron deficiency 10/23/2020     Priority: 1 - High    History of irregular menstrual cycles 10/23/2020    Family history of hereditary hemorrhagic telangiectasia 10/23/2020    Fatigue 10/15/2020    Obstructive sleep apnea 10/15/2020    Migraine without aura and without status migrainosus, not intractable 07/16/2020    Anxiety and depression 07/16/2020     Active Problem List with Overview Notes    Diagnosis Date Noted    Dietary iron deficiency 10/23/2020    History of irregular menstrual cycles 10/23/2020    Family history of hereditary hemorrhagic telangiectasia 10/23/2020     Maternal GF and maternal aunt      Fatigue 10/15/2020    Obstructive sleep apnea 10/15/2020    Migraine without aura and without status migrainosus, not intractable 07/16/2020     Usually monthly, photophobia and nausea   imitrex is abortive       Anxiety and depression 07/16/2020     Has been on ssri for 3 years, recently increased to 20 mg, better controlled            Oncology History    No history exists.     Assessment :       1. Anxiety and depression    2. Iron deficiency anemia due to chronic blood loss    3. Dietary iron deficiency    4. Fatigue, unspecified type    5. History of irregular menstrual cycles    6. Family history of hereditary hemorrhagic telangiectasia        Plan :       Dietary iron deficiency  -labs reviewed, hemoglobin 13.5 normal  -iron profile reviewed, suggestive of iron deficiency  -Patient denies any bleeding.  Complains of irregular menstrual cycles  -patient advised to take oral iron ferrous sulfate 325 mg daily  -MiraLax p.r.n. for constipation  -return to clinic in 3 months with repeat labs.    Anxiety and depression  -patient endorses features of anxiety, depression and nightmares  -better with Lexapro 20  -advised patient to restart seeing a therapist  -Atarax 25 mg p.r.n. for anxiety    History of  irregular menstrual cycles  -patient has an appointment with Ob Gy in November 2020    Family history of hereditary hemorrhagic telangiectasia  -monitor      Problem List Items Addressed This Visit     Dietary iron deficiency    Current Assessment & Plan     -labs reviewed, hemoglobin 13.5 normal  -iron profile reviewed, suggestive of iron deficiency  -Patient denies any bleeding.  Complains of irregular menstrual cycles  -patient advised to take oral iron ferrous sulfate 325 mg daily  -MiraLax p.r.n. for constipation  -return to clinic in 3 months with repeat labs.         Relevant Medications    hydrOXYzine HCL (ATARAX) 25 MG tablet    Other Relevant Orders    CBC auto differential    Comprehensive Metabolic Panel    Iron and TIBC    Ferritin    Vitamin B12    Methylmalonic Acid, Serum    Folate    Lactate Dehydrogenase    Reticulocytes    Anxiety and depression - Primary    Overview     Has been on ssri for 3 years, recently increased to 20 mg, better controlled          Current Assessment & Plan     -patient endorses features of anxiety, depression and nightmares  -better with Lexapro 20  -advised patient to restart seeing a therapist  -Atarax 25 mg p.r.n. for anxiety         Relevant Medications    hydrOXYzine HCL (ATARAX) 25 MG tablet    Fatigue    History of irregular menstrual cycles    Current Assessment & Plan     -patient has an appointment with Ob Gy in November 2020         Family history of hereditary hemorrhagic telangiectasia    Overview     Maternal GF and maternal aunt         Current Assessment & Plan     -monitor           Other Visit Diagnoses     Iron deficiency anemia due to chronic blood loss              I spent 60 minutes with the patient with at least 50% of the time on face-to-face counseling or coordination of care.     Electronically signed by Char Nieves          Future Appointments   Date Time Provider Department Center   11/9/2020  3:30 PM Alicia Vu MD [id number]  [facility]   11/25/2020  7:15 AM MD [facility] Kenny

## 2020-10-26 ENCOUNTER — PATIENT MESSAGE (OUTPATIENT)
Dept: FAMILY MEDICINE | Facility: CLINIC | Age: 25
End: 2020-10-26

## 2020-11-02 ENCOUNTER — TELEPHONE (OUTPATIENT)
Dept: SLEEP MEDICINE | Facility: OTHER | Age: 25
End: 2020-11-02

## 2020-11-06 ENCOUNTER — TELEPHONE (OUTPATIENT)
Dept: SLEEP MEDICINE | Facility: OTHER | Age: 25
End: 2020-11-06

## 2020-11-15 ENCOUNTER — PATIENT MESSAGE (OUTPATIENT)
Dept: FAMILY MEDICINE | Facility: CLINIC | Age: 25
End: 2020-11-15

## 2020-11-19 ENCOUNTER — TELEPHONE (OUTPATIENT)
Dept: OBSTETRICS AND GYNECOLOGY | Facility: CLINIC | Age: 25
End: 2020-11-19

## 2020-11-19 ENCOUNTER — OFFICE VISIT (OUTPATIENT)
Dept: OBSTETRICS AND GYNECOLOGY | Facility: CLINIC | Age: 25
End: 2020-11-19
Attending: OBSTETRICS & GYNECOLOGY
Payer: COMMERCIAL

## 2020-11-19 VITALS
SYSTOLIC BLOOD PRESSURE: 112 MMHG | DIASTOLIC BLOOD PRESSURE: 70 MMHG | HEIGHT: 65 IN | BODY MASS INDEX: 31.51 KG/M2 | WEIGHT: 189.13 LBS

## 2020-11-19 DIAGNOSIS — Z30.431 SURVEILLANCE OF INTRAUTERINE CONTRACEPTIVE DEVICE: Primary | ICD-10-CM

## 2020-11-19 DIAGNOSIS — E61.1 DIETARY IRON DEFICIENCY: ICD-10-CM

## 2020-11-19 DIAGNOSIS — N93.9 ABNORMAL UTERINE BLEEDING: Primary | ICD-10-CM

## 2020-11-19 PROCEDURE — 1126F PR PAIN SEVERITY QUANTIFIED, NO PAIN PRESENT: ICD-10-PCS | Mod: S$GLB,,, | Performed by: OBSTETRICS & GYNECOLOGY

## 2020-11-19 PROCEDURE — 99999 PR PBB SHADOW E&M-EST. PATIENT-LVL III: ICD-10-PCS | Mod: PBBFAC,,, | Performed by: OBSTETRICS & GYNECOLOGY

## 2020-11-19 PROCEDURE — 3008F PR BODY MASS INDEX (BMI) DOCUMENTED: ICD-10-PCS | Mod: CPTII,S$GLB,, | Performed by: OBSTETRICS & GYNECOLOGY

## 2020-11-19 PROCEDURE — 99213 PR OFFICE/OUTPT VISIT, EST, LEVL III, 20-29 MIN: ICD-10-PCS | Mod: S$GLB,,, | Performed by: OBSTETRICS & GYNECOLOGY

## 2020-11-19 PROCEDURE — 99999 PR PBB SHADOW E&M-EST. PATIENT-LVL III: CPT | Mod: PBBFAC,,, | Performed by: OBSTETRICS & GYNECOLOGY

## 2020-11-19 PROCEDURE — 3008F BODY MASS INDEX DOCD: CPT | Mod: CPTII,S$GLB,, | Performed by: OBSTETRICS & GYNECOLOGY

## 2020-11-19 PROCEDURE — 1126F AMNT PAIN NOTED NONE PRSNT: CPT | Mod: S$GLB,,, | Performed by: OBSTETRICS & GYNECOLOGY

## 2020-11-19 PROCEDURE — 99213 OFFICE O/P EST LOW 20 MIN: CPT | Mod: S$GLB,,, | Performed by: OBSTETRICS & GYNECOLOGY

## 2020-11-19 NOTE — PROGRESS NOTES
SUBJECTIVE:   24 y.o. female   for follow up of anemia. Patient's last menstrual period was 2020..   Seen  for [employer] with normal PAP.  PCP found her to have anemia and thinks heavy periods are contributing. Reports periods are usually every month, but can be heavy in flow.   Patient currently sexually active and uses condoms.  Desires Kyleena IUD.         Past Medical History:   Diagnosis Date    Anxiety     Depression      Past Surgical History:   Procedure Laterality Date    NO PAST SURGERIES       Social History     Socioeconomic History    Marital status: Single     Spouse name: Not on file    Number of children: Not on file    Years of education: Not on file    Highest education level: Not on file   Occupational History    Not on file   Social Needs    Financial resource strain: Not very hard    Food insecurity     Worry: Never true     Inability: Never true    Transportation needs     Medical: No     Non-medical: No   Tobacco Use    Smoking status: Never Smoker    Smokeless tobacco: Never Used   Substance and Sexual Activity    Alcohol use: Yes     Frequency: Monthly or less     Drinks per session: 1 or 2     Binge frequency: Less than monthly     Comment: social    Drug use: Never    Sexual activity: Yes     Birth control/protection: Condom   Lifestyle    Physical activity     Days per week: 1 day     Minutes per session: 30 min    Stress: To some extent   Relationships    Social connections     Talks on phone: Twice a week     Gets together: Twice a week     Attends Gnosticism service: Not on file     Active member of club or organization: No     Attends meetings of clubs or organizations: Never     Relationship status: Never    Other Topics Concern    Not on file   Social History Narrative    Not on file     Family History   Problem Relation Age of Onset    Anemia Maternal Grandfather     Cancer Paternal Grandfather     Anemia Paternal Aunt     Breast cancer  Neg Hx     Colon cancer Neg Hx     Ovarian cancer Neg Hx      OB History    Para Term  AB Living   0 0 0 0 0 0   SAB TAB Ectopic Multiple Live Births   0 0 0 0 0         Current Outpatient Medications   Medication Sig Dispense Refill    escitalopram oxalate (LEXAPRO) 20 MG tablet Take 1 tablet (20 mg total) by mouth once daily. 90 tablet 3    hydrOXYzine HCL (ATARAX) 25 MG tablet Take 1 tablet (25 mg total) by mouth 4 (four) times daily as needed for Anxiety. 60 tablet 3    sumatriptan (IMITREX) 50 MG tablet TAKE 1 TABLET BY MOUTH AT START OF HEADACHE MAY REPEAT AFTER 2 HOURS 9 tablet 1     No current facility-administered medications for this visit.      Allergies: Amoxicillin, Keflex [cephalexin], Pertussis vaccines, and Sulfa (sulfonamide antibiotics)     ROS:  Constitutional: no weight loss, weight gain, fever, fatigue  Eyes:  No vision changes, glasses/contacts  ENT/Mouth: No ulcers, sinus problems, ears ringing, headache  Cardiovascular: No inability to lie flat, chest pain, exercise intolerance, swelling, heart palpitations  Respiratory: No wheezing, coughing blood, shortness of breath, or cough  Gastrointestinal: No diarrhea, bloody stool, nausea/vomiting, constipation, gas, hemorrhoids  Genitourinary: No blood in urine, painful urination, urgency of urination, frequency of urination, incomplete emptying, incontinence, abnormal bleeding, painful periods, heavy periods, vaginal discharge, vaginal odor, painful intercourse, sexual problems, bleeding after intercourse.  Musculoskeletal: No muscle weakness  Skin/Breast: No painful breasts, nipple discharge, masses, rash, ulcers  Neurological: No passing out, seizures, numbness, headache  Endocrine: No diabetes, hypothyroid, hyperthyroid, hot flashes, hair loss, abnormal hair growth, ance  Psychiatric: No depression, crying  Hematologic: No bruises, bleeding, swollen lymph nodes, +anemia.      OBJECTIVE:   The patient appears well, alert,  "oriented x 3, in no distress.  /70   Ht 5' 5" (1.651 m)   Wt 85.8 kg (189 lb 2.5 oz)   LMP 11/16/2020   BMI 31.48 kg/m²     ASSESSMENT:   1. Abnormal uterine bleeding     2. Dietary iron deficiency         PLAN:       Discussed AUB, mgt options-- OCP's, Lysteda, Kyleena.  H/o anxiety depression and prefers trial of Kyleena.  RTC for Kyleena insertion.  Will need GC, CT cx  Return to clinic prn    "

## 2020-11-24 ENCOUNTER — TELEPHONE (OUTPATIENT)
Dept: SLEEP MEDICINE | Facility: OTHER | Age: 25
End: 2020-11-24

## 2020-12-04 ENCOUNTER — TELEPHONE (OUTPATIENT)
Dept: SLEEP MEDICINE | Facility: OTHER | Age: 25
End: 2020-12-04

## 2020-12-04 NOTE — TELEPHONE ENCOUNTER
Left messages to schedule the home sleep study,no response.  Sent out a message through tydy to schedule.

## 2020-12-08 ENCOUNTER — PATIENT MESSAGE (OUTPATIENT)
Dept: OBSTETRICS AND GYNECOLOGY | Facility: CLINIC | Age: 25
End: 2020-12-08

## 2020-12-17 ENCOUNTER — PROCEDURE VISIT (OUTPATIENT)
Dept: OBSTETRICS AND GYNECOLOGY | Facility: CLINIC | Age: 25
End: 2020-12-17
Attending: OBSTETRICS & GYNECOLOGY
Payer: COMMERCIAL

## 2020-12-17 VITALS
HEIGHT: 66 IN | BODY MASS INDEX: 31.04 KG/M2 | WEIGHT: 193.13 LBS | DIASTOLIC BLOOD PRESSURE: 70 MMHG | SYSTOLIC BLOOD PRESSURE: 130 MMHG

## 2020-12-17 DIAGNOSIS — Z11.3 SCREENING FOR VENEREAL DISEASE: ICD-10-CM

## 2020-12-17 DIAGNOSIS — Z01.812 PRE-PROCEDURE LAB EXAM: ICD-10-CM

## 2020-12-17 DIAGNOSIS — Z30.430 ENCOUNTER FOR INSERTION OF INTRAUTERINE CONTRACEPTIVE DEVICE (IUD): Primary | ICD-10-CM

## 2020-12-17 LAB
B-HCG UR QL: NEGATIVE
CTP QC/QA: YES

## 2020-12-17 PROCEDURE — 58300 INSERT INTRAUTERINE DEVICE: CPT | Mod: S$GLB,,, | Performed by: OBSTETRICS & GYNECOLOGY

## 2020-12-17 PROCEDURE — 58300 PR INSERT INTRAUTERINE DEVICE: ICD-10-PCS | Mod: S$GLB,,, | Performed by: OBSTETRICS & GYNECOLOGY

## 2020-12-19 ENCOUNTER — TELEPHONE (OUTPATIENT)
Dept: OBSTETRICS AND GYNECOLOGY | Facility: CLINIC | Age: 25
End: 2020-12-19

## 2020-12-19 LAB
C TRACH RRNA SPEC QL NAA+PROBE: POSITIVE
N GONORRHOEA RRNA SPEC QL NAA+PROBE: NEGATIVE

## 2020-12-19 RX ORDER — AZITHROMYCIN 500 MG/1
1000 TABLET, FILM COATED ORAL ONCE
Qty: 2 TABLET | Refills: 0 | Status: SHIPPED | OUTPATIENT
Start: 2020-12-19 | End: 2020-12-19

## 2020-12-19 NOTE — PROCEDURES
Procedures   Anabel Frost is a 25 y.o. female  presents for IUD placement.  Patient's last menstrual period was 12/10/2020 (exact date)..  She desires Kyleena  UPT is negative.      She was counseled on the risks, benefits, indications, and alternatives to IUD use.  She understands that with insertion there is a risk of bleeding, infection, and uterine perforation.  All questions are answered.  Consents signed.  Cervical cultures were performed.    Procedure:  Time out performed.  The cervix was visualized with a speculum.  Cervix was swabbed with Betadine  An allis was placed on the anterior lip of the cervix.  The uterus sounds to 7cm using sterile technique.  A Kyleena was loaded and placed high in the uterine fundus without difficulty using sterile technique.  The string was was then cut.  The Allis and speculum were removed.  The patient tolerated the procedure well.    Assessment:  1.  Contraceptive management/IUD insertion    Post IUD placement counseling:  Manage post IUD placement pain with NSAIDS, Tylenol or Rx per Medcard.  IUD danger signs and how to check for strings were discussed.  The IUD needs to be removed in 5 years for Mirena or Kyleena, and 10 years for Paragard Copper IUD.    Counseling lasted approximately 15 minutes and all her questions were answered.    Follow up:  2-4 weeks.

## 2020-12-21 ENCOUNTER — PATIENT MESSAGE (OUTPATIENT)
Dept: OBSTETRICS AND GYNECOLOGY | Facility: CLINIC | Age: 25
End: 2020-12-21

## 2020-12-21 NOTE — TELEPHONE ENCOUNTER
Patient notified positive CT culture- treatment complete- Patient will repeat culture at IUD Check appointment

## 2021-01-06 ENCOUNTER — TELEPHONE (OUTPATIENT)
Dept: SLEEP MEDICINE | Facility: OTHER | Age: 26
End: 2021-01-06

## 2021-01-14 ENCOUNTER — PATIENT OUTREACH (OUTPATIENT)
Dept: ADMINISTRATIVE | Facility: OTHER | Age: 26
End: 2021-01-14

## 2021-01-20 ENCOUNTER — OFFICE VISIT (OUTPATIENT)
Dept: OBSTETRICS AND GYNECOLOGY | Facility: CLINIC | Age: 26
End: 2021-01-20
Attending: OBSTETRICS & GYNECOLOGY
Payer: COMMERCIAL

## 2021-01-20 ENCOUNTER — LAB VISIT (OUTPATIENT)
Dept: LAB | Facility: OTHER | Age: 26
End: 2021-01-20
Attending: OBSTETRICS & GYNECOLOGY
Payer: COMMERCIAL

## 2021-01-20 ENCOUNTER — PATIENT MESSAGE (OUTPATIENT)
Dept: OBSTETRICS AND GYNECOLOGY | Facility: CLINIC | Age: 26
End: 2021-01-20

## 2021-01-20 VITALS
BODY MASS INDEX: 32.25 KG/M2 | HEIGHT: 65 IN | WEIGHT: 193.56 LBS | DIASTOLIC BLOOD PRESSURE: 68 MMHG | SYSTOLIC BLOOD PRESSURE: 122 MMHG

## 2021-01-20 DIAGNOSIS — Z30.431 IUD CHECK UP: Primary | ICD-10-CM

## 2021-01-20 DIAGNOSIS — Z11.3 SCREENING FOR VENEREAL DISEASE: ICD-10-CM

## 2021-01-20 PROCEDURE — 36415 COLL VENOUS BLD VENIPUNCTURE: CPT

## 2021-01-20 PROCEDURE — 99999 PR PBB SHADOW E&M-EST. PATIENT-LVL III: CPT | Mod: PBBFAC,,, | Performed by: OBSTETRICS & GYNECOLOGY

## 2021-01-20 PROCEDURE — 86592 SYPHILIS TEST NON-TREP QUAL: CPT

## 2021-01-20 PROCEDURE — 3008F PR BODY MASS INDEX (BMI) DOCUMENTED: ICD-10-PCS | Mod: CPTII,S$GLB,, | Performed by: OBSTETRICS & GYNECOLOGY

## 2021-01-20 PROCEDURE — 87340 HEPATITIS B SURFACE AG IA: CPT

## 2021-01-20 PROCEDURE — 3008F BODY MASS INDEX DOCD: CPT | Mod: CPTII,S$GLB,, | Performed by: OBSTETRICS & GYNECOLOGY

## 2021-01-20 PROCEDURE — 99213 PR OFFICE/OUTPT VISIT, EST, LEVL III, 20-29 MIN: ICD-10-PCS | Mod: S$GLB,,, | Performed by: OBSTETRICS & GYNECOLOGY

## 2021-01-20 PROCEDURE — 99999 PR PBB SHADOW E&M-EST. PATIENT-LVL III: ICD-10-PCS | Mod: PBBFAC,,, | Performed by: OBSTETRICS & GYNECOLOGY

## 2021-01-20 PROCEDURE — 99213 OFFICE O/P EST LOW 20 MIN: CPT | Mod: S$GLB,,, | Performed by: OBSTETRICS & GYNECOLOGY

## 2021-01-20 PROCEDURE — 86703 HIV-1/HIV-2 1 RESULT ANTBDY: CPT

## 2021-01-20 PROCEDURE — 1126F AMNT PAIN NOTED NONE PRSNT: CPT | Mod: S$GLB,,, | Performed by: OBSTETRICS & GYNECOLOGY

## 2021-01-20 PROCEDURE — 1126F PR PAIN SEVERITY QUANTIFIED, NO PAIN PRESENT: ICD-10-PCS | Mod: S$GLB,,, | Performed by: OBSTETRICS & GYNECOLOGY

## 2021-01-21 LAB
HBV SURFACE AG SERPL QL IA: NEGATIVE
HIV 1+2 AB+HIV1 P24 AG SERPL QL IA: NEGATIVE
RPR SER QL: NORMAL

## 2021-01-24 LAB
C TRACH RRNA SPEC QL NAA+PROBE: NEGATIVE
N GONORRHOEA RRNA SPEC QL NAA+PROBE: NEGATIVE

## 2021-01-28 ENCOUNTER — LAB VISIT (OUTPATIENT)
Dept: LAB | Facility: OTHER | Age: 26
End: 2021-01-28
Attending: STUDENT IN AN ORGANIZED HEALTH CARE EDUCATION/TRAINING PROGRAM
Payer: COMMERCIAL

## 2021-01-28 DIAGNOSIS — E61.1 DIETARY IRON DEFICIENCY: ICD-10-CM

## 2021-01-28 LAB
ALBUMIN SERPL BCP-MCNC: 4.3 G/DL (ref 3.5–5.2)
ALP SERPL-CCNC: 82 U/L (ref 55–135)
ALT SERPL W/O P-5'-P-CCNC: 22 U/L (ref 10–44)
ANION GAP SERPL CALC-SCNC: 8 MMOL/L (ref 8–16)
AST SERPL-CCNC: 18 U/L (ref 10–40)
BASOPHILS # BLD AUTO: 0.08 K/UL (ref 0–0.2)
BASOPHILS NFR BLD: 1 % (ref 0–1.9)
BILIRUB SERPL-MCNC: 0.3 MG/DL (ref 0.1–1)
BUN SERPL-MCNC: 11 MG/DL (ref 6–20)
CALCIUM SERPL-MCNC: 8.9 MG/DL (ref 8.7–10.5)
CHLORIDE SERPL-SCNC: 106 MMOL/L (ref 95–110)
CO2 SERPL-SCNC: 26 MMOL/L (ref 23–29)
CREAT SERPL-MCNC: 0.8 MG/DL (ref 0.5–1.4)
DIFFERENTIAL METHOD: NORMAL
EOSINOPHIL # BLD AUTO: 0.2 K/UL (ref 0–0.5)
EOSINOPHIL NFR BLD: 2.2 % (ref 0–8)
ERYTHROCYTE [DISTWIDTH] IN BLOOD BY AUTOMATED COUNT: 13.1 % (ref 11.5–14.5)
EST. GFR  (AFRICAN AMERICAN): >60 ML/MIN/1.73 M^2
EST. GFR  (NON AFRICAN AMERICAN): >60 ML/MIN/1.73 M^2
FERRITIN SERPL-MCNC: 34 NG/ML (ref 20–300)
FOLATE SERPL-MCNC: 9.8 NG/ML (ref 4–24)
GLUCOSE SERPL-MCNC: 94 MG/DL (ref 70–110)
HCT VFR BLD AUTO: 44.1 % (ref 37–48.5)
HGB BLD-MCNC: 14.1 G/DL (ref 12–16)
IMM GRANULOCYTES # BLD AUTO: 0.04 K/UL (ref 0–0.04)
IMM GRANULOCYTES NFR BLD AUTO: 0.5 % (ref 0–0.5)
IRON SERPL-MCNC: 51 UG/DL (ref 30–160)
LDH SERPL L TO P-CCNC: 209 U/L (ref 110–260)
LYMPHOCYTES # BLD AUTO: 3.6 K/UL (ref 1–4.8)
LYMPHOCYTES NFR BLD: 43.3 % (ref 18–48)
MCH RBC QN AUTO: 28.5 PG (ref 27–31)
MCHC RBC AUTO-ENTMCNC: 32 G/DL (ref 32–36)
MCV RBC AUTO: 89 FL (ref 82–98)
MONOCYTES # BLD AUTO: 0.5 K/UL (ref 0.3–1)
MONOCYTES NFR BLD: 6.2 % (ref 4–15)
NEUTROPHILS # BLD AUTO: 3.9 K/UL (ref 1.8–7.7)
NEUTROPHILS NFR BLD: 46.8 % (ref 38–73)
NRBC BLD-RTO: 0 /100 WBC
PLATELET # BLD AUTO: 300 K/UL (ref 150–350)
PMV BLD AUTO: 10 FL (ref 9.2–12.9)
POTASSIUM SERPL-SCNC: 4.2 MMOL/L (ref 3.5–5.1)
PROT SERPL-MCNC: 7.7 G/DL (ref 6–8.4)
RBC # BLD AUTO: 4.94 M/UL (ref 4–5.4)
RETICS/RBC NFR AUTO: 1.9 % (ref 0.5–2.5)
SATURATED IRON: 12 % (ref 20–50)
SODIUM SERPL-SCNC: 140 MMOL/L (ref 136–145)
TOTAL IRON BINDING CAPACITY: 416 UG/DL (ref 250–450)
TRANSFERRIN SERPL-MCNC: 281 MG/DL (ref 200–375)
VIT B12 SERPL-MCNC: 328 PG/ML (ref 210–950)
WBC # BLD AUTO: 8.26 K/UL (ref 3.9–12.7)

## 2021-01-28 PROCEDURE — 80053 COMPREHEN METABOLIC PANEL: CPT

## 2021-01-28 PROCEDURE — 82607 VITAMIN B-12: CPT

## 2021-01-28 PROCEDURE — 82746 ASSAY OF FOLIC ACID SERUM: CPT

## 2021-01-28 PROCEDURE — 83540 ASSAY OF IRON: CPT

## 2021-01-28 PROCEDURE — 83615 LACTATE (LD) (LDH) ENZYME: CPT

## 2021-01-28 PROCEDURE — 83921 ORGANIC ACID SINGLE QUANT: CPT

## 2021-01-28 PROCEDURE — 85045 AUTOMATED RETICULOCYTE COUNT: CPT

## 2021-01-28 PROCEDURE — 82728 ASSAY OF FERRITIN: CPT

## 2021-01-28 PROCEDURE — 85025 COMPLETE CBC W/AUTO DIFF WBC: CPT

## 2021-01-30 LAB — METHYLMALONATE SERPL-SCNC: 0.27 UMOL/L

## 2021-01-31 DIAGNOSIS — G43.009 MIGRAINE WITHOUT AURA AND WITHOUT STATUS MIGRAINOSUS, NOT INTRACTABLE: ICD-10-CM

## 2021-02-01 RX ORDER — SUMATRIPTAN 50 MG/1
TABLET, FILM COATED ORAL
Qty: 9 TABLET | Refills: 1 | Status: SHIPPED | OUTPATIENT
Start: 2021-02-01 | End: 2021-07-05 | Stop reason: SDUPTHER

## 2021-02-02 ENCOUNTER — OFFICE VISIT (OUTPATIENT)
Dept: HEMATOLOGY/ONCOLOGY | Facility: CLINIC | Age: 26
End: 2021-02-02
Payer: COMMERCIAL

## 2021-02-02 VITALS
HEIGHT: 65 IN | SYSTOLIC BLOOD PRESSURE: 124 MMHG | BODY MASS INDEX: 32.51 KG/M2 | HEART RATE: 77 BPM | TEMPERATURE: 99 F | OXYGEN SATURATION: 96 % | DIASTOLIC BLOOD PRESSURE: 72 MMHG | WEIGHT: 195.13 LBS | RESPIRATION RATE: 18 BRPM

## 2021-02-02 DIAGNOSIS — R53.83 FATIGUE, UNSPECIFIED TYPE: ICD-10-CM

## 2021-02-02 DIAGNOSIS — E61.1 DIETARY IRON DEFICIENCY: ICD-10-CM

## 2021-02-02 DIAGNOSIS — D50.0 IRON DEFICIENCY ANEMIA DUE TO CHRONIC BLOOD LOSS: Primary | ICD-10-CM

## 2021-02-02 DIAGNOSIS — Z87.42 HISTORY OF IRREGULAR MENSTRUAL CYCLES: ICD-10-CM

## 2021-02-02 PROCEDURE — 1126F AMNT PAIN NOTED NONE PRSNT: CPT | Mod: S$GLB,,, | Performed by: STUDENT IN AN ORGANIZED HEALTH CARE EDUCATION/TRAINING PROGRAM

## 2021-02-02 PROCEDURE — 99999 PR PBB SHADOW E&M-EST. PATIENT-LVL III: CPT | Mod: PBBFAC,,, | Performed by: STUDENT IN AN ORGANIZED HEALTH CARE EDUCATION/TRAINING PROGRAM

## 2021-02-02 PROCEDURE — 99213 PR OFFICE/OUTPT VISIT, EST, LEVL III, 20-29 MIN: ICD-10-PCS | Mod: S$GLB,,, | Performed by: STUDENT IN AN ORGANIZED HEALTH CARE EDUCATION/TRAINING PROGRAM

## 2021-02-02 PROCEDURE — 3008F BODY MASS INDEX DOCD: CPT | Mod: CPTII,S$GLB,, | Performed by: STUDENT IN AN ORGANIZED HEALTH CARE EDUCATION/TRAINING PROGRAM

## 2021-02-02 PROCEDURE — 1126F PR PAIN SEVERITY QUANTIFIED, NO PAIN PRESENT: ICD-10-PCS | Mod: S$GLB,,, | Performed by: STUDENT IN AN ORGANIZED HEALTH CARE EDUCATION/TRAINING PROGRAM

## 2021-02-02 PROCEDURE — 99999 PR PBB SHADOW E&M-EST. PATIENT-LVL III: ICD-10-PCS | Mod: PBBFAC,,, | Performed by: STUDENT IN AN ORGANIZED HEALTH CARE EDUCATION/TRAINING PROGRAM

## 2021-02-02 PROCEDURE — 3008F PR BODY MASS INDEX (BMI) DOCUMENTED: ICD-10-PCS | Mod: CPTII,S$GLB,, | Performed by: STUDENT IN AN ORGANIZED HEALTH CARE EDUCATION/TRAINING PROGRAM

## 2021-02-02 PROCEDURE — 99213 OFFICE O/P EST LOW 20 MIN: CPT | Mod: S$GLB,,, | Performed by: STUDENT IN AN ORGANIZED HEALTH CARE EDUCATION/TRAINING PROGRAM

## 2021-02-02 RX ORDER — FERROUS GLUCONATE 324(38)MG
324 TABLET ORAL
COMMUNITY
End: 2022-04-26

## 2021-03-01 ENCOUNTER — PATIENT MESSAGE (OUTPATIENT)
Dept: HEMATOLOGY/ONCOLOGY | Facility: CLINIC | Age: 26
End: 2021-03-01

## 2021-03-01 ENCOUNTER — TELEPHONE (OUTPATIENT)
Dept: HEMATOLOGY/ONCOLOGY | Facility: CLINIC | Age: 26
End: 2021-03-01

## 2021-03-18 ENCOUNTER — PATIENT MESSAGE (OUTPATIENT)
Dept: FAMILY MEDICINE | Facility: CLINIC | Age: 26
End: 2021-03-18

## 2021-03-19 RX ORDER — AZITHROMYCIN 250 MG/1
TABLET, FILM COATED ORAL
Qty: 6 TABLET | Refills: 0 | Status: SHIPPED | OUTPATIENT
Start: 2021-03-19 | End: 2021-03-24

## 2021-06-02 ENCOUNTER — OFFICE VISIT (OUTPATIENT)
Dept: HEMATOLOGY/ONCOLOGY | Facility: CLINIC | Age: 26
End: 2021-06-02
Payer: COMMERCIAL

## 2021-06-02 ENCOUNTER — LAB VISIT (OUTPATIENT)
Dept: LAB | Facility: OTHER | Age: 26
End: 2021-06-02
Attending: STUDENT IN AN ORGANIZED HEALTH CARE EDUCATION/TRAINING PROGRAM
Payer: COMMERCIAL

## 2021-06-02 VITALS
WEIGHT: 200.19 LBS | RESPIRATION RATE: 12 BRPM | OXYGEN SATURATION: 97 % | DIASTOLIC BLOOD PRESSURE: 73 MMHG | HEART RATE: 88 BPM | BODY MASS INDEX: 33.35 KG/M2 | TEMPERATURE: 99 F | SYSTOLIC BLOOD PRESSURE: 125 MMHG | HEIGHT: 65 IN

## 2021-06-02 DIAGNOSIS — D50.0 IRON DEFICIENCY ANEMIA DUE TO CHRONIC BLOOD LOSS: Primary | ICD-10-CM

## 2021-06-02 DIAGNOSIS — Z87.42 HISTORY OF IRREGULAR MENSTRUAL CYCLES: ICD-10-CM

## 2021-06-02 DIAGNOSIS — F41.9 ANXIETY AND DEPRESSION: ICD-10-CM

## 2021-06-02 DIAGNOSIS — R53.83 FATIGUE, UNSPECIFIED TYPE: ICD-10-CM

## 2021-06-02 DIAGNOSIS — F32.A ANXIETY AND DEPRESSION: ICD-10-CM

## 2021-06-02 DIAGNOSIS — D50.0 IRON DEFICIENCY ANEMIA DUE TO CHRONIC BLOOD LOSS: ICD-10-CM

## 2021-06-02 DIAGNOSIS — E61.1 DIETARY IRON DEFICIENCY: ICD-10-CM

## 2021-06-02 DIAGNOSIS — Z82.49 FAMILY HISTORY OF HEREDITARY HEMORRHAGIC TELANGIECTASIA: ICD-10-CM

## 2021-06-02 LAB
ALBUMIN SERPL BCP-MCNC: 4 G/DL (ref 3.5–5.2)
ALP SERPL-CCNC: 93 U/L (ref 55–135)
ALT SERPL W/O P-5'-P-CCNC: 40 U/L (ref 10–44)
ANION GAP SERPL CALC-SCNC: 10 MMOL/L (ref 8–16)
AST SERPL-CCNC: 26 U/L (ref 10–40)
BASOPHILS # BLD AUTO: 0.08 K/UL (ref 0–0.2)
BASOPHILS NFR BLD: 0.9 % (ref 0–1.9)
BILIRUB SERPL-MCNC: 0.4 MG/DL (ref 0.1–1)
BUN SERPL-MCNC: 11 MG/DL (ref 6–20)
CALCIUM SERPL-MCNC: 9.5 MG/DL (ref 8.7–10.5)
CHLORIDE SERPL-SCNC: 105 MMOL/L (ref 95–110)
CO2 SERPL-SCNC: 23 MMOL/L (ref 23–29)
CREAT SERPL-MCNC: 0.8 MG/DL (ref 0.5–1.4)
DIFFERENTIAL METHOD: ABNORMAL
EOSINOPHIL # BLD AUTO: 0.2 K/UL (ref 0–0.5)
EOSINOPHIL NFR BLD: 2 % (ref 0–8)
ERYTHROCYTE [DISTWIDTH] IN BLOOD BY AUTOMATED COUNT: 12.4 % (ref 11.5–14.5)
EST. GFR  (AFRICAN AMERICAN): >60 ML/MIN/1.73 M^2
EST. GFR  (NON AFRICAN AMERICAN): >60 ML/MIN/1.73 M^2
FERRITIN SERPL-MCNC: 72 NG/ML (ref 20–300)
GLUCOSE SERPL-MCNC: 105 MG/DL (ref 70–110)
HCT VFR BLD AUTO: 44.9 % (ref 37–48.5)
HGB BLD-MCNC: 14.5 G/DL (ref 12–16)
IMM GRANULOCYTES # BLD AUTO: 0.07 K/UL (ref 0–0.04)
IMM GRANULOCYTES NFR BLD AUTO: 0.8 % (ref 0–0.5)
IRON SERPL-MCNC: 71 UG/DL (ref 30–160)
LDH SERPL L TO P-CCNC: 194 U/L (ref 110–260)
LYMPHOCYTES # BLD AUTO: 3.1 K/UL (ref 1–4.8)
LYMPHOCYTES NFR BLD: 36.6 % (ref 18–48)
MCH RBC QN AUTO: 29.2 PG (ref 27–31)
MCHC RBC AUTO-ENTMCNC: 32.3 G/DL (ref 32–36)
MCV RBC AUTO: 91 FL (ref 82–98)
MONOCYTES # BLD AUTO: 0.6 K/UL (ref 0.3–1)
MONOCYTES NFR BLD: 7 % (ref 4–15)
NEUTROPHILS # BLD AUTO: 4.5 K/UL (ref 1.8–7.7)
NEUTROPHILS NFR BLD: 52.7 % (ref 38–73)
NRBC BLD-RTO: 0 /100 WBC
PLATELET # BLD AUTO: 313 K/UL (ref 150–450)
PMV BLD AUTO: 10.9 FL (ref 9.2–12.9)
POTASSIUM SERPL-SCNC: 4.1 MMOL/L (ref 3.5–5.1)
PROT SERPL-MCNC: 8 G/DL (ref 6–8.4)
RBC # BLD AUTO: 4.96 M/UL (ref 4–5.4)
RETICS/RBC NFR AUTO: 1.9 % (ref 0.5–2.5)
SATURATED IRON: 17 % (ref 20–50)
SODIUM SERPL-SCNC: 138 MMOL/L (ref 136–145)
TOTAL IRON BINDING CAPACITY: 429 UG/DL (ref 250–450)
TRANSFERRIN SERPL-MCNC: 290 MG/DL (ref 200–375)
WBC # BLD AUTO: 8.52 K/UL (ref 3.9–12.7)

## 2021-06-02 PROCEDURE — 85045 AUTOMATED RETICULOCYTE COUNT: CPT | Performed by: STUDENT IN AN ORGANIZED HEALTH CARE EDUCATION/TRAINING PROGRAM

## 2021-06-02 PROCEDURE — 99213 OFFICE O/P EST LOW 20 MIN: CPT | Mod: S$GLB,,, | Performed by: STUDENT IN AN ORGANIZED HEALTH CARE EDUCATION/TRAINING PROGRAM

## 2021-06-02 PROCEDURE — 1126F PR PAIN SEVERITY QUANTIFIED, NO PAIN PRESENT: ICD-10-PCS | Mod: S$GLB,,, | Performed by: STUDENT IN AN ORGANIZED HEALTH CARE EDUCATION/TRAINING PROGRAM

## 2021-06-02 PROCEDURE — 99999 PR PBB SHADOW E&M-EST. PATIENT-LVL III: CPT | Mod: PBBFAC,,, | Performed by: STUDENT IN AN ORGANIZED HEALTH CARE EDUCATION/TRAINING PROGRAM

## 2021-06-02 PROCEDURE — 99213 PR OFFICE/OUTPT VISIT, EST, LEVL III, 20-29 MIN: ICD-10-PCS | Mod: S$GLB,,, | Performed by: STUDENT IN AN ORGANIZED HEALTH CARE EDUCATION/TRAINING PROGRAM

## 2021-06-02 PROCEDURE — 3008F BODY MASS INDEX DOCD: CPT | Mod: CPTII,S$GLB,, | Performed by: STUDENT IN AN ORGANIZED HEALTH CARE EDUCATION/TRAINING PROGRAM

## 2021-06-02 PROCEDURE — 80053 COMPREHEN METABOLIC PANEL: CPT | Performed by: STUDENT IN AN ORGANIZED HEALTH CARE EDUCATION/TRAINING PROGRAM

## 2021-06-02 PROCEDURE — 1126F AMNT PAIN NOTED NONE PRSNT: CPT | Mod: S$GLB,,, | Performed by: STUDENT IN AN ORGANIZED HEALTH CARE EDUCATION/TRAINING PROGRAM

## 2021-06-02 PROCEDURE — 99999 PR PBB SHADOW E&M-EST. PATIENT-LVL III: ICD-10-PCS | Mod: PBBFAC,,, | Performed by: STUDENT IN AN ORGANIZED HEALTH CARE EDUCATION/TRAINING PROGRAM

## 2021-06-02 PROCEDURE — 82728 ASSAY OF FERRITIN: CPT | Performed by: STUDENT IN AN ORGANIZED HEALTH CARE EDUCATION/TRAINING PROGRAM

## 2021-06-02 PROCEDURE — 36415 COLL VENOUS BLD VENIPUNCTURE: CPT | Performed by: STUDENT IN AN ORGANIZED HEALTH CARE EDUCATION/TRAINING PROGRAM

## 2021-06-02 PROCEDURE — 83540 ASSAY OF IRON: CPT | Performed by: STUDENT IN AN ORGANIZED HEALTH CARE EDUCATION/TRAINING PROGRAM

## 2021-06-02 PROCEDURE — 83615 LACTATE (LD) (LDH) ENZYME: CPT | Performed by: STUDENT IN AN ORGANIZED HEALTH CARE EDUCATION/TRAINING PROGRAM

## 2021-06-02 PROCEDURE — 3008F PR BODY MASS INDEX (BMI) DOCUMENTED: ICD-10-PCS | Mod: CPTII,S$GLB,, | Performed by: STUDENT IN AN ORGANIZED HEALTH CARE EDUCATION/TRAINING PROGRAM

## 2021-06-02 PROCEDURE — 85025 COMPLETE CBC W/AUTO DIFF WBC: CPT | Performed by: STUDENT IN AN ORGANIZED HEALTH CARE EDUCATION/TRAINING PROGRAM

## 2021-06-24 ENCOUNTER — PATIENT MESSAGE (OUTPATIENT)
Dept: FAMILY MEDICINE | Facility: CLINIC | Age: 26
End: 2021-06-24

## 2021-06-24 DIAGNOSIS — F41.9 ANXIETY AND DEPRESSION: Primary | ICD-10-CM

## 2021-06-24 DIAGNOSIS — F51.4 NIGHT TERRORS: ICD-10-CM

## 2021-06-24 DIAGNOSIS — F32.A ANXIETY AND DEPRESSION: Primary | ICD-10-CM

## 2021-07-05 DIAGNOSIS — G43.009 MIGRAINE WITHOUT AURA AND WITHOUT STATUS MIGRAINOSUS, NOT INTRACTABLE: ICD-10-CM

## 2021-07-06 RX ORDER — SUMATRIPTAN 50 MG/1
TABLET, FILM COATED ORAL
Qty: 9 TABLET | Refills: 1 | Status: SHIPPED | OUTPATIENT
Start: 2021-07-06 | End: 2021-12-11 | Stop reason: SDUPTHER

## 2021-07-08 ENCOUNTER — TELEPHONE (OUTPATIENT)
Dept: PSYCHIATRY | Facility: CLINIC | Age: 26
End: 2021-07-08

## 2021-07-08 ENCOUNTER — PATIENT MESSAGE (OUTPATIENT)
Dept: PSYCHIATRY | Facility: CLINIC | Age: 26
End: 2021-07-08

## 2021-07-08 ENCOUNTER — OFFICE VISIT (OUTPATIENT)
Dept: PSYCHIATRY | Facility: CLINIC | Age: 26
End: 2021-07-08
Payer: COMMERCIAL

## 2021-07-08 VITALS
DIASTOLIC BLOOD PRESSURE: 73 MMHG | HEIGHT: 66 IN | BODY MASS INDEX: 31.64 KG/M2 | SYSTOLIC BLOOD PRESSURE: 117 MMHG | OXYGEN SATURATION: 98 % | TEMPERATURE: 99 F | HEART RATE: 88 BPM | WEIGHT: 196.88 LBS | RESPIRATION RATE: 18 BRPM

## 2021-07-08 DIAGNOSIS — F32.A ANXIETY AND DEPRESSION: ICD-10-CM

## 2021-07-08 DIAGNOSIS — F33.1 MDD (MAJOR DEPRESSIVE DISORDER), RECURRENT EPISODE, MODERATE: Primary | ICD-10-CM

## 2021-07-08 DIAGNOSIS — E66.9 CLASS 1 OBESITY: ICD-10-CM

## 2021-07-08 DIAGNOSIS — F41.9 ANXIETY AND DEPRESSION: ICD-10-CM

## 2021-07-08 PROBLEM — E66.811 CLASS 1 OBESITY: Status: ACTIVE | Noted: 2021-07-08

## 2021-07-08 PROCEDURE — 99999 PR PBB SHADOW E&M-EST. PATIENT-LVL IV: CPT | Mod: PBBFAC,,, | Performed by: NURSE PRACTITIONER

## 2021-07-08 PROCEDURE — 99999 PR PBB SHADOW E&M-EST. PATIENT-LVL IV: ICD-10-PCS | Mod: PBBFAC,,, | Performed by: NURSE PRACTITIONER

## 2021-07-08 PROCEDURE — 99204 PR OFFICE/OUTPT VISIT, NEW, LEVL IV, 45-59 MIN: ICD-10-PCS | Mod: S$GLB,,, | Performed by: NURSE PRACTITIONER

## 2021-07-08 PROCEDURE — 99204 OFFICE O/P NEW MOD 45 MIN: CPT | Mod: S$GLB,,, | Performed by: NURSE PRACTITIONER

## 2021-07-08 PROCEDURE — 1126F PR PAIN SEVERITY QUANTIFIED, NO PAIN PRESENT: ICD-10-PCS | Mod: S$GLB,,, | Performed by: NURSE PRACTITIONER

## 2021-07-08 PROCEDURE — 1126F AMNT PAIN NOTED NONE PRSNT: CPT | Mod: S$GLB,,, | Performed by: NURSE PRACTITIONER

## 2021-07-08 PROCEDURE — 3008F PR BODY MASS INDEX (BMI) DOCUMENTED: ICD-10-PCS | Mod: CPTII,S$GLB,, | Performed by: NURSE PRACTITIONER

## 2021-07-08 PROCEDURE — 3008F BODY MASS INDEX DOCD: CPT | Mod: CPTII,S$GLB,, | Performed by: NURSE PRACTITIONER

## 2021-07-08 RX ORDER — VENLAFAXINE 37.5 MG/1
37.5 TABLET ORAL 2 TIMES DAILY
Qty: 60 TABLET | Refills: 1 | Status: SHIPPED | OUTPATIENT
Start: 2021-07-08 | End: 2021-07-30

## 2021-07-22 ENCOUNTER — CLINICAL SUPPORT (OUTPATIENT)
Dept: URGENT CARE | Facility: CLINIC | Age: 26
End: 2021-07-22
Payer: COMMERCIAL

## 2021-07-22 DIAGNOSIS — Z11.59 ENCOUNTER FOR SCREENING FOR OTHER VIRAL DISEASES: Primary | ICD-10-CM

## 2021-07-22 LAB
CTP QC/QA: YES
SARS-COV-2 RDRP RESP QL NAA+PROBE: NEGATIVE

## 2021-07-22 PROCEDURE — U0002: ICD-10-PCS | Mod: QW,S$GLB,, | Performed by: INTERNAL MEDICINE

## 2021-07-22 PROCEDURE — U0002 COVID-19 LAB TEST NON-CDC: HCPCS | Mod: QW,S$GLB,, | Performed by: INTERNAL MEDICINE

## 2021-07-28 ENCOUNTER — PATIENT MESSAGE (OUTPATIENT)
Dept: PSYCHIATRY | Facility: CLINIC | Age: 26
End: 2021-07-28

## 2021-08-07 ENCOUNTER — PATIENT MESSAGE (OUTPATIENT)
Dept: PSYCHIATRY | Facility: CLINIC | Age: 26
End: 2021-08-07

## 2021-08-08 ENCOUNTER — OCCUPATIONAL HEALTH (OUTPATIENT)
Dept: URGENT CARE | Facility: CLINIC | Age: 26
End: 2021-08-08
Payer: COMMERCIAL

## 2021-08-08 DIAGNOSIS — Z11.9 SCREENING EXAMINATION FOR UNSPECIFIED INFECTIOUS DISEASE: Primary | ICD-10-CM

## 2021-08-08 LAB
CTP QC/QA: YES
SARS-COV-2 RDRP RESP QL NAA+PROBE: NEGATIVE

## 2021-08-08 PROCEDURE — U0002: ICD-10-PCS | Mod: QW,S$GLB,, | Performed by: FAMILY MEDICINE

## 2021-08-08 PROCEDURE — U0002 COVID-19 LAB TEST NON-CDC: HCPCS | Mod: QW,S$GLB,, | Performed by: FAMILY MEDICINE

## 2021-08-08 PROCEDURE — 99211 PR OFFICE/OUTPT VISIT, EST, LEVL I: ICD-10-PCS | Mod: S$GLB,,, | Performed by: FAMILY MEDICINE

## 2021-08-08 PROCEDURE — 99211 OFF/OP EST MAY X REQ PHY/QHP: CPT | Mod: S$GLB,,, | Performed by: FAMILY MEDICINE

## 2021-08-20 ENCOUNTER — CLINICAL SUPPORT (OUTPATIENT)
Dept: URGENT CARE | Facility: CLINIC | Age: 26
End: 2021-08-20
Payer: COMMERCIAL

## 2021-08-20 DIAGNOSIS — Z11.9 ENCOUNTER FOR SCREENING EXAMINATION FOR INFECTIOUS DISEASE: Primary | ICD-10-CM

## 2021-08-20 LAB
CTP QC/QA: YES
SARS-COV-2 RDRP RESP QL NAA+PROBE: NEGATIVE

## 2021-08-20 PROCEDURE — U0002 COVID-19 LAB TEST NON-CDC: HCPCS | Mod: QW,S$GLB,, | Performed by: FAMILY MEDICINE

## 2021-08-20 PROCEDURE — U0002: ICD-10-PCS | Mod: QW,S$GLB,, | Performed by: FAMILY MEDICINE

## 2021-08-20 PROCEDURE — 99211 PR OFFICE/OUTPT VISIT, EST, LEVL I: ICD-10-PCS | Mod: S$GLB,CS,, | Performed by: FAMILY MEDICINE

## 2021-08-20 PROCEDURE — 99211 OFF/OP EST MAY X REQ PHY/QHP: CPT | Mod: S$GLB,CS,, | Performed by: FAMILY MEDICINE

## 2021-10-25 ENCOUNTER — PATIENT MESSAGE (OUTPATIENT)
Dept: PSYCHIATRY | Facility: CLINIC | Age: 26
End: 2021-10-25
Payer: COMMERCIAL

## 2021-10-26 ENCOUNTER — PATIENT MESSAGE (OUTPATIENT)
Dept: FAMILY MEDICINE | Facility: CLINIC | Age: 26
End: 2021-10-26
Payer: COMMERCIAL

## 2021-10-26 DIAGNOSIS — F33.1 MDD (MAJOR DEPRESSIVE DISORDER), RECURRENT EPISODE, MODERATE: ICD-10-CM

## 2021-10-27 RX ORDER — VENLAFAXINE 37.5 MG/1
37.5 TABLET ORAL 2 TIMES DAILY
Qty: 60 TABLET | Refills: 1 | Status: SHIPPED | OUTPATIENT
Start: 2021-10-27 | End: 2021-11-18

## 2021-11-30 ENCOUNTER — PATIENT MESSAGE (OUTPATIENT)
Dept: HEMATOLOGY/ONCOLOGY | Facility: CLINIC | Age: 26
End: 2021-11-30
Payer: COMMERCIAL

## 2021-12-01 ENCOUNTER — LAB VISIT (OUTPATIENT)
Dept: LAB | Facility: OTHER | Age: 26
End: 2021-12-01
Attending: INTERNAL MEDICINE
Payer: COMMERCIAL

## 2021-12-01 DIAGNOSIS — D50.0 IRON DEFICIENCY ANEMIA DUE TO CHRONIC BLOOD LOSS: ICD-10-CM

## 2021-12-01 LAB
ALBUMIN SERPL BCP-MCNC: 3.9 G/DL (ref 3.5–5.2)
ALP SERPL-CCNC: 99 U/L (ref 55–135)
ALT SERPL W/O P-5'-P-CCNC: 38 U/L (ref 10–44)
ANION GAP SERPL CALC-SCNC: 11 MMOL/L (ref 8–16)
AST SERPL-CCNC: 29 U/L (ref 10–40)
BASOPHILS # BLD AUTO: 0.06 K/UL (ref 0–0.2)
BASOPHILS NFR BLD: 0.6 % (ref 0–1.9)
BILIRUB SERPL-MCNC: 0.3 MG/DL (ref 0.1–1)
BUN SERPL-MCNC: 10 MG/DL (ref 6–20)
CALCIUM SERPL-MCNC: 9.1 MG/DL (ref 8.7–10.5)
CHLORIDE SERPL-SCNC: 104 MMOL/L (ref 95–110)
CO2 SERPL-SCNC: 23 MMOL/L (ref 23–29)
CREAT SERPL-MCNC: 0.9 MG/DL (ref 0.5–1.4)
DIFFERENTIAL METHOD: ABNORMAL
EOSINOPHIL # BLD AUTO: 0.1 K/UL (ref 0–0.5)
EOSINOPHIL NFR BLD: 1.2 % (ref 0–8)
ERYTHROCYTE [DISTWIDTH] IN BLOOD BY AUTOMATED COUNT: 12 % (ref 11.5–14.5)
EST. GFR  (AFRICAN AMERICAN): >60 ML/MIN/1.73 M^2
EST. GFR  (NON AFRICAN AMERICAN): >60 ML/MIN/1.73 M^2
FERRITIN SERPL-MCNC: 87 NG/ML (ref 20–300)
GLUCOSE SERPL-MCNC: 143 MG/DL (ref 70–110)
HCT VFR BLD AUTO: 43.7 % (ref 37–48.5)
HGB BLD-MCNC: 14.1 G/DL (ref 12–16)
IMM GRANULOCYTES # BLD AUTO: 0.04 K/UL (ref 0–0.04)
IMM GRANULOCYTES NFR BLD AUTO: 0.4 % (ref 0–0.5)
IRON SERPL-MCNC: 127 UG/DL (ref 30–160)
LDH SERPL L TO P-CCNC: 185 U/L (ref 110–260)
LYMPHOCYTES # BLD AUTO: 3.7 K/UL (ref 1–4.8)
LYMPHOCYTES NFR BLD: 39.8 % (ref 18–48)
MCH RBC QN AUTO: 29.3 PG (ref 27–31)
MCHC RBC AUTO-ENTMCNC: 32.3 G/DL (ref 32–36)
MCV RBC AUTO: 91 FL (ref 82–98)
MONOCYTES # BLD AUTO: 0.3 K/UL (ref 0.3–1)
MONOCYTES NFR BLD: 2.8 % (ref 4–15)
NEUTROPHILS # BLD AUTO: 5.1 K/UL (ref 1.8–7.7)
NEUTROPHILS NFR BLD: 55.2 % (ref 38–73)
NRBC BLD-RTO: 0 /100 WBC
PLATELET # BLD AUTO: 322 K/UL (ref 150–450)
PMV BLD AUTO: 10.4 FL (ref 9.2–12.9)
POTASSIUM SERPL-SCNC: 3.8 MMOL/L (ref 3.5–5.1)
PROT SERPL-MCNC: 7.6 G/DL (ref 6–8.4)
RBC # BLD AUTO: 4.81 M/UL (ref 4–5.4)
RETICS/RBC NFR AUTO: 1.9 % (ref 0.5–2.5)
SATURATED IRON: 32 % (ref 20–50)
SODIUM SERPL-SCNC: 138 MMOL/L (ref 136–145)
TOTAL IRON BINDING CAPACITY: 401 UG/DL (ref 250–450)
TRANSFERRIN SERPL-MCNC: 271 MG/DL (ref 200–375)
WBC # BLD AUTO: 9.32 K/UL (ref 3.9–12.7)

## 2021-12-01 PROCEDURE — 84466 ASSAY OF TRANSFERRIN: CPT | Performed by: STUDENT IN AN ORGANIZED HEALTH CARE EDUCATION/TRAINING PROGRAM

## 2021-12-01 PROCEDURE — 80053 COMPREHEN METABOLIC PANEL: CPT | Performed by: STUDENT IN AN ORGANIZED HEALTH CARE EDUCATION/TRAINING PROGRAM

## 2021-12-01 PROCEDURE — 85025 COMPLETE CBC W/AUTO DIFF WBC: CPT | Performed by: STUDENT IN AN ORGANIZED HEALTH CARE EDUCATION/TRAINING PROGRAM

## 2021-12-01 PROCEDURE — 85045 AUTOMATED RETICULOCYTE COUNT: CPT | Performed by: STUDENT IN AN ORGANIZED HEALTH CARE EDUCATION/TRAINING PROGRAM

## 2021-12-01 PROCEDURE — 82728 ASSAY OF FERRITIN: CPT | Performed by: STUDENT IN AN ORGANIZED HEALTH CARE EDUCATION/TRAINING PROGRAM

## 2021-12-01 PROCEDURE — 36415 COLL VENOUS BLD VENIPUNCTURE: CPT | Performed by: STUDENT IN AN ORGANIZED HEALTH CARE EDUCATION/TRAINING PROGRAM

## 2021-12-01 PROCEDURE — 83615 LACTATE (LD) (LDH) ENZYME: CPT | Performed by: STUDENT IN AN ORGANIZED HEALTH CARE EDUCATION/TRAINING PROGRAM

## 2021-12-02 ENCOUNTER — OFFICE VISIT (OUTPATIENT)
Dept: FAMILY MEDICINE | Facility: CLINIC | Age: 26
End: 2021-12-02
Payer: COMMERCIAL

## 2021-12-02 DIAGNOSIS — F32.A ANXIETY AND DEPRESSION: ICD-10-CM

## 2021-12-02 DIAGNOSIS — F41.9 ANXIETY AND DEPRESSION: ICD-10-CM

## 2021-12-02 PROCEDURE — 99213 OFFICE O/P EST LOW 20 MIN: CPT | Mod: 95,,, | Performed by: INTERNAL MEDICINE

## 2021-12-02 PROCEDURE — 99213 PR OFFICE/OUTPT VISIT, EST, LEVL III, 20-29 MIN: ICD-10-PCS | Mod: 95,,, | Performed by: INTERNAL MEDICINE

## 2021-12-03 ENCOUNTER — OFFICE VISIT (OUTPATIENT)
Dept: HEMATOLOGY/ONCOLOGY | Facility: CLINIC | Age: 26
End: 2021-12-03
Payer: COMMERCIAL

## 2021-12-03 DIAGNOSIS — Z82.49 FAMILY HISTORY OF HEREDITARY HEMORRHAGIC TELANGIECTASIA: ICD-10-CM

## 2021-12-03 DIAGNOSIS — Z87.42 HISTORY OF IRREGULAR MENSTRUAL CYCLES: ICD-10-CM

## 2021-12-03 DIAGNOSIS — F32.A ANXIETY AND DEPRESSION: ICD-10-CM

## 2021-12-03 DIAGNOSIS — D50.0 IRON DEFICIENCY ANEMIA DUE TO CHRONIC BLOOD LOSS: Primary | ICD-10-CM

## 2021-12-03 DIAGNOSIS — F41.9 ANXIETY AND DEPRESSION: ICD-10-CM

## 2021-12-03 PROCEDURE — 99213 PR OFFICE/OUTPT VISIT, EST, LEVL III, 20-29 MIN: ICD-10-PCS | Mod: 95,,, | Performed by: STUDENT IN AN ORGANIZED HEALTH CARE EDUCATION/TRAINING PROGRAM

## 2021-12-03 PROCEDURE — 99213 OFFICE O/P EST LOW 20 MIN: CPT | Mod: 95,,, | Performed by: STUDENT IN AN ORGANIZED HEALTH CARE EDUCATION/TRAINING PROGRAM

## 2021-12-11 DIAGNOSIS — G43.009 MIGRAINE WITHOUT AURA AND WITHOUT STATUS MIGRAINOSUS, NOT INTRACTABLE: ICD-10-CM

## 2021-12-11 DIAGNOSIS — E61.1 DIETARY IRON DEFICIENCY: ICD-10-CM

## 2021-12-11 DIAGNOSIS — F32.A ANXIETY AND DEPRESSION: ICD-10-CM

## 2021-12-11 DIAGNOSIS — F41.9 ANXIETY AND DEPRESSION: ICD-10-CM

## 2021-12-13 RX ORDER — SUMATRIPTAN 50 MG/1
TABLET, FILM COATED ORAL
Qty: 9 TABLET | Refills: 1 | Status: SHIPPED | OUTPATIENT
Start: 2021-12-13 | End: 2022-02-03 | Stop reason: SDUPTHER

## 2021-12-13 RX ORDER — HYDROXYZINE HYDROCHLORIDE 25 MG/1
25 TABLET, FILM COATED ORAL 4 TIMES DAILY PRN
Qty: 60 TABLET | Refills: 0 | Status: SHIPPED | OUTPATIENT
Start: 2021-12-13 | End: 2022-09-01

## 2021-12-15 ENCOUNTER — CLINICAL SUPPORT (OUTPATIENT)
Dept: URGENT CARE | Facility: CLINIC | Age: 26
End: 2021-12-15
Payer: COMMERCIAL

## 2021-12-15 DIAGNOSIS — Z11.59 SCREENING FOR VIRAL DISEASE: Primary | ICD-10-CM

## 2021-12-15 LAB
CTP QC/QA: YES
SARS-COV-2 RDRP RESP QL NAA+PROBE: NEGATIVE

## 2021-12-15 PROCEDURE — U0002 COVID-19 LAB TEST NON-CDC: HCPCS | Mod: QW,S$GLB,, | Performed by: PHYSICIAN ASSISTANT

## 2021-12-15 PROCEDURE — U0002: ICD-10-PCS | Mod: QW,S$GLB,, | Performed by: PHYSICIAN ASSISTANT

## 2021-12-20 DIAGNOSIS — F33.1 MDD (MAJOR DEPRESSIVE DISORDER), RECURRENT EPISODE, MODERATE: ICD-10-CM

## 2021-12-22 ENCOUNTER — OFFICE VISIT (OUTPATIENT)
Dept: INTERNAL MEDICINE | Facility: CLINIC | Age: 26
End: 2021-12-22
Payer: COMMERCIAL

## 2021-12-22 DIAGNOSIS — H60.333 ACUTE SWIMMER'S EAR OF BOTH SIDES: Primary | ICD-10-CM

## 2021-12-22 PROCEDURE — 99213 PR OFFICE/OUTPT VISIT, EST, LEVL III, 20-29 MIN: ICD-10-PCS | Mod: 95,,, | Performed by: NURSE PRACTITIONER

## 2021-12-22 PROCEDURE — 99213 OFFICE O/P EST LOW 20 MIN: CPT | Mod: 95,,, | Performed by: NURSE PRACTITIONER

## 2021-12-22 RX ORDER — METHYLPREDNISOLONE 4 MG/1
TABLET ORAL
Qty: 21 EACH | Refills: 0 | Status: SHIPPED | OUTPATIENT
Start: 2021-12-22 | End: 2022-01-12

## 2021-12-22 RX ORDER — NEOMYCIN SULFATE, POLYMYXIN B SULFATE, HYDROCORTISONE 3.5; 10000; 1 MG/ML; [USP'U]/ML; MG/ML
3 SOLUTION/ DROPS AURICULAR (OTIC) EVERY 8 HOURS
Qty: 10 ML | Refills: 0 | Status: SHIPPED | OUTPATIENT
Start: 2021-12-22 | End: 2022-11-01 | Stop reason: SDUPTHER

## 2021-12-27 RX ORDER — VENLAFAXINE 37.5 MG/1
TABLET ORAL
Qty: 180 TABLET | Refills: 3 | Status: SHIPPED | OUTPATIENT
Start: 2021-12-27 | End: 2022-11-18

## 2022-02-03 ENCOUNTER — OFFICE VISIT (OUTPATIENT)
Dept: INTERNAL MEDICINE | Facility: CLINIC | Age: 27
End: 2022-02-03
Payer: COMMERCIAL

## 2022-02-03 ENCOUNTER — PATIENT MESSAGE (OUTPATIENT)
Dept: INTERNAL MEDICINE | Facility: CLINIC | Age: 27
End: 2022-02-03

## 2022-02-03 VITALS
WEIGHT: 198 LBS | DIASTOLIC BLOOD PRESSURE: 72 MMHG | TEMPERATURE: 99 F | HEIGHT: 66 IN | SYSTOLIC BLOOD PRESSURE: 136 MMHG | OXYGEN SATURATION: 98 % | HEART RATE: 85 BPM | BODY MASS INDEX: 31.82 KG/M2

## 2022-02-03 DIAGNOSIS — F32.A ANXIETY AND DEPRESSION: ICD-10-CM

## 2022-02-03 DIAGNOSIS — G44.209 TENSION HEADACHE: ICD-10-CM

## 2022-02-03 DIAGNOSIS — G43.009 MIGRAINE WITHOUT AURA AND WITHOUT STATUS MIGRAINOSUS, NOT INTRACTABLE: ICD-10-CM

## 2022-02-03 DIAGNOSIS — Z23 NEEDS FLU SHOT: ICD-10-CM

## 2022-02-03 DIAGNOSIS — G44.209 TENSION HEADACHE: Primary | ICD-10-CM

## 2022-02-03 DIAGNOSIS — F41.9 ANXIETY AND DEPRESSION: ICD-10-CM

## 2022-02-03 PROCEDURE — 3075F PR MOST RECENT SYSTOLIC BLOOD PRESS GE 130-139MM HG: ICD-10-PCS | Mod: CPTII,S$GLB,, | Performed by: INTERNAL MEDICINE

## 2022-02-03 PROCEDURE — 90471 IMMUNIZATION ADMIN: CPT | Mod: S$GLB,,, | Performed by: INTERNAL MEDICINE

## 2022-02-03 PROCEDURE — 99214 PR OFFICE/OUTPT VISIT, EST, LEVL IV, 30-39 MIN: ICD-10-PCS | Mod: 25,S$GLB,, | Performed by: INTERNAL MEDICINE

## 2022-02-03 PROCEDURE — 90686 FLU VACCINE (QUAD) GREATER THAN OR EQUAL TO 3YO PRESERVATIVE FREE IM: ICD-10-PCS | Mod: S$GLB,,, | Performed by: INTERNAL MEDICINE

## 2022-02-03 PROCEDURE — 99999 PR PBB SHADOW E&M-EST. PATIENT-LVL III: ICD-10-PCS | Mod: PBBFAC,,, | Performed by: INTERNAL MEDICINE

## 2022-02-03 PROCEDURE — 3008F BODY MASS INDEX DOCD: CPT | Mod: CPTII,S$GLB,, | Performed by: INTERNAL MEDICINE

## 2022-02-03 PROCEDURE — 99999 PR PBB SHADOW E&M-EST. PATIENT-LVL III: CPT | Mod: PBBFAC,,, | Performed by: INTERNAL MEDICINE

## 2022-02-03 PROCEDURE — 90471 FLU VACCINE (QUAD) GREATER THAN OR EQUAL TO 3YO PRESERVATIVE FREE IM: ICD-10-PCS | Mod: S$GLB,,, | Performed by: INTERNAL MEDICINE

## 2022-02-03 PROCEDURE — 3078F PR MOST RECENT DIASTOLIC BLOOD PRESSURE < 80 MM HG: ICD-10-PCS | Mod: CPTII,S$GLB,, | Performed by: INTERNAL MEDICINE

## 2022-02-03 PROCEDURE — 99214 OFFICE O/P EST MOD 30 MIN: CPT | Mod: 25,S$GLB,, | Performed by: INTERNAL MEDICINE

## 2022-02-03 PROCEDURE — 3078F DIAST BP <80 MM HG: CPT | Mod: CPTII,S$GLB,, | Performed by: INTERNAL MEDICINE

## 2022-02-03 PROCEDURE — 3075F SYST BP GE 130 - 139MM HG: CPT | Mod: CPTII,S$GLB,, | Performed by: INTERNAL MEDICINE

## 2022-02-03 PROCEDURE — 1160F PR REVIEW ALL MEDS BY PRESCRIBER/CLIN PHARMACIST DOCUMENTED: ICD-10-PCS | Mod: CPTII,S$GLB,, | Performed by: INTERNAL MEDICINE

## 2022-02-03 PROCEDURE — 3008F PR BODY MASS INDEX (BMI) DOCUMENTED: ICD-10-PCS | Mod: CPTII,S$GLB,, | Performed by: INTERNAL MEDICINE

## 2022-02-03 PROCEDURE — 1159F PR MEDICATION LIST DOCUMENTED IN MEDICAL RECORD: ICD-10-PCS | Mod: CPTII,S$GLB,, | Performed by: INTERNAL MEDICINE

## 2022-02-03 PROCEDURE — 1159F MED LIST DOCD IN RCRD: CPT | Mod: CPTII,S$GLB,, | Performed by: INTERNAL MEDICINE

## 2022-02-03 PROCEDURE — 90686 IIV4 VACC NO PRSV 0.5 ML IM: CPT | Mod: S$GLB,,, | Performed by: INTERNAL MEDICINE

## 2022-02-03 PROCEDURE — 1160F RVW MEDS BY RX/DR IN RCRD: CPT | Mod: CPTII,S$GLB,, | Performed by: INTERNAL MEDICINE

## 2022-02-03 RX ORDER — SUMATRIPTAN SUCCINATE 100 MG/1
TABLET ORAL
Qty: 20 TABLET | Refills: 1 | Status: SHIPPED | OUTPATIENT
Start: 2022-02-03 | End: 2022-09-23 | Stop reason: SDUPTHER

## 2022-02-03 RX ORDER — CYCLOBENZAPRINE HCL 10 MG
10 TABLET ORAL NIGHTLY PRN
Qty: 30 TABLET | Refills: 0 | Status: SHIPPED | OUTPATIENT
Start: 2022-02-03 | End: 2024-01-15 | Stop reason: SDUPTHER

## 2022-02-03 RX ORDER — CYCLOBENZAPRINE HYDROCHLORIDE 7.5 MG/1
7.5 TABLET, FILM COATED ORAL 3 TIMES DAILY PRN
Qty: 30 TABLET | Refills: 0 | Status: SHIPPED | OUTPATIENT
Start: 2022-02-03 | End: 2022-02-03

## 2022-02-03 NOTE — TELEPHONE ENCOUNTER
[facility] advised pt that Cyclobenzaprine 7.5mg #30 is not covered under insurance. Spoke with the pharmacist and he advised that is you change dosage to 5mg or 10 mg it will likely be covered.

## 2022-02-03 NOTE — PROGRESS NOTES
[facility] Note    Chief Complaint      Chief Complaint   Patient presents with    Headache     Becoming more frequent    Migraine     Lasting 24hrs    Referral     To psych     History of Present Illness      Anabel Frost is a 26 y.o. female who presents today for chief complaint follow up headaches and anxiety.    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 for stress reasons, and [facility] [employer] is still a struggle with covid and staffing  Is doing well on effexor, does not want to change dose or meds   Headaches: bitemporal pressure, marible with applying pressure to masseter region, or behind eyes, happens a few tines a month , sometimes waskes up withheadache   Saw heme who wanted to dc Fe supplementation   Pap utd   Active Problem List with Overview Notes    Diagnosis Date Noted    Tension headache 02/06/2022    Class 1 obesity 07/08/2021    MDD (major depressive disorder), recurrent episode, moderate 07/08/2021    Dietary iron deficiency 10/23/2020    History of irregular menstrual cycles 10/23/2020    Family history of hereditary hemorrhagic telangiectasia 10/23/2020     Maternal GF and maternal aunt      Fatigue 10/15/2020    Obstructive sleep apnea 10/15/2020    Migraine without aura and without status migrainosus, not intractable 07/16/2020     Usually monthly, photophobia and nausea   imitrex is abortive       Anxiety and depression 07/16/2020     Has been on ssri for 3 years, recently increased to 20 mg, better controlled          Health Maintenance   Topic Date Due    HPV Vaccines (1 - 2-dose series) Never done    TETANUS VACCINE  Never done    Pap Smear  06/08/2023    Hepatitis C Screening  Completed    Lipid Panel  Completed       Past Medical History:   Diagnosis Date    Anxiety     Class 1 obesity 7/8/2021    Cluster headaches     Depression     Migraines        Past Surgical History:   Procedure Laterality Date     NO PAST SURGERIES         family history includes Anemia in her maternal grandfather and paternal aunt; Cancer in her paternal grandfather.    Social History     Tobacco Use    Smoking status: Never Smoker    Smokeless tobacco: Never Used   Substance Use Topics    Alcohol use: Yes     Comment: social    Drug use: Never       Review of Systems   Constitutional: Negative for chills, fever, malaise/fatigue and weight loss.   Respiratory: Negative for cough, sputum production, shortness of breath and wheezing.    Cardiovascular: Negative for chest pain, palpitations, orthopnea and leg swelling.   Gastrointestinal: Negative for constipation, diarrhea, nausea and vomiting.   Genitourinary: Negative for dysuria, frequency, hematuria and urgency.        Outpatient Encounter Medications as of 2/3/2022   Medication Sig Dispense Refill    hydrOXYzine HCL (ATARAX) 25 MG tablet Take 1 tablet (25 mg total) by mouth 4 (four) times daily as needed for Anxiety. 60 tablet 0    venlafaxine (EFFEXOR) 37.5 MG Tab TAKE 1 TABLET BY MOUTH TWICE A  tablet 3    [DISCONTINUED] sumatriptan (IMITREX) 50 MG tablet TAKE 1 TABLET BY MOUTH AT START OF HEADACHE MAY REPEAT AFTER 2 HOURS 9 tablet 1    ferrous gluconate (FERGON) 324 MG tablet Take 324 mg by mouth daily with breakfast. OTC      sumatriptan (IMITREX) 100 MG tablet TAKE 1 TABLET BY MOUTH AT START OF HEADACHE MAY REPEAT AFTER 2 HOURS 20 tablet 1    [DISCONTINUED] cyclobenzaprine (FEXMID) 7.5 MG Tab Take 1 tablet (7.5 mg total) by mouth 3 (three) times daily as needed (headahce neck pain). 30 tablet 0     No facility-administered encounter medications on file as of 2/3/2022.        Review of patient's allergies indicates:   Allergen Reactions    Amoxicillin Other (See Comments)    Keflex [cephalexin] Other (See Comments)    Pertussis vaccines Other (See Comments)    Sulfa (sulfonamide antibiotics) Other (See Comments)           Physical Exam      Vital Signs  Temp: 98.6  "°F (37 °C)  Temp src: Oral  Pulse: 85  SpO2: 98 %  BP: 136/72  BP Location: Left arm  Patient Position: Sitting  Pain Score:   2  Height and Weight  Height: 5' 6" (167.6 cm)  Weight: 89.8 kg (197 lb 15.6 oz)  BSA (Calculated - sq m): 2.04 sq meters  BMI (Calculated): 32  Weight in (lb) to have BMI = 25: 154.6]    Physical Exam  Vitals reviewed.   Constitutional:       General: She is not in acute distress.     Appearance: She is well-developed and well-nourished. She is not diaphoretic.   HENT:      Head: Normocephalic and atraumatic.      Right Ear: External ear normal.      Left Ear: External ear normal.      Nose: Nose normal.   Eyes:      General:         Right eye: No discharge.         Left eye: No discharge.      Extraocular Movements: EOM normal.      Conjunctiva/sclera: Conjunctivae normal.   Pulmonary:      Effort: Pulmonary effort is normal. No respiratory distress.   Musculoskeletal:         General: Normal range of motion.      Cervical back: Normal range of motion.   Skin:     Coloration: Skin is not pale.      Findings: No rash.   Neurological:      Mental Status: She is alert and oriented to person, place, and time.   Psychiatric:         Mood and Affect: Mood and affect normal.         Behavior: Behavior normal.         Thought Content: Thought content normal.         Judgment: Judgment normal.          Laboratory:  CBC:  Recent Labs   Lab Result Units 12/01/21  1535   WBC K/uL 9.32   RBC M/uL 4.81   Hemoglobin g/dL 14.1   Hematocrit % 43.7   Platelets K/uL 322   MCV fL 91   MCH pg 29.3   MCHC g/dL 32.3     CMP:  Recent Labs   Lab Result Units 12/01/21  1535   Glucose mg/dL 143*   Calcium mg/dL 9.1   Albumin g/dL 3.9   Total Protein g/dL 7.6   Sodium mmol/L 138   Potassium mmol/L 3.8   CO2 mmol/L 23   Chloride mmol/L 104   BUN mg/dL 10   Alkaline Phosphatase U/L 99   ALT U/L 38   AST U/L 29   Total Bilirubin mg/dL 0.3     URINALYSIS:  No results for input(s): COLORU, CLARITYU, SPECGRAV, PHUR, " PROTEINUA, GLUCOSEU, BILIRUBINCON, BLOODU, WBCU, RBCU, BACTERIA, MUCUS, NITRITE, LEUKOCYTESUR, UROBILINOGEN, HYALINECASTS in the last 2160 hours.   LIPIDS:  No results for input(s): TSH, HDL, CHOL, TRIG, LDLCALC, CHOLHDL, NONHDLCHOL, TOTALCHOLEST in the last 2160 hours.  TSH:  No results for input(s): TSH in the last 2160 hours.  A1C:  No results for input(s): HGBA1C in the last 2160 hours.    Radiology:      Assessment/Plan     Anabel Frost is a 26 y.o.female with:    Anxiety and depression  Stable doing well     Tension headache  nsaids heat stretch prn flexeril     Migraine without aura and without status migrainosus, not intractable  Prn triptan       Orders Placed This Encounter   Procedures    Influenza - Quadrivalent (PF)       Use of the [facility] Patient Portal discussed and encouraged during today's visit  -Continue current medications and maintain follow up with specialists.  Return to clinic in 6 months.  Future Appointments   Date Time Provider Department Center   8/4/2022  3:45 PM Romina Juares MD formerly [facility]       Romina Juares MD  2/6/2022 2:02 PM    Primary Care Internal Medicine - [facility]

## 2022-02-03 NOTE — TELEPHONE ENCOUNTER
No new care gaps identified.  Powered by [employer]. Reference number: [id number].   2/03/2022 2:20:35 PM CST

## 2022-02-06 PROBLEM — G44.209 TENSION HEADACHE: Status: ACTIVE | Noted: 2022-02-06

## 2022-03-09 ENCOUNTER — PATIENT MESSAGE (OUTPATIENT)
Dept: INTERNAL MEDICINE | Facility: CLINIC | Age: 27
End: 2022-03-09
Payer: COMMERCIAL

## 2022-03-10 ENCOUNTER — PATIENT MESSAGE (OUTPATIENT)
Dept: INTERNAL MEDICINE | Facility: CLINIC | Age: 27
End: 2022-03-10
Payer: COMMERCIAL

## 2022-04-23 ENCOUNTER — PATIENT MESSAGE (OUTPATIENT)
Dept: OBSTETRICS AND GYNECOLOGY | Facility: CLINIC | Age: 27
End: 2022-04-23
Payer: COMMERCIAL

## 2022-04-25 ENCOUNTER — PATIENT OUTREACH (OUTPATIENT)
Dept: ADMINISTRATIVE | Facility: OTHER | Age: 27
End: 2022-04-25
Payer: COMMERCIAL

## 2022-04-25 ENCOUNTER — TELEPHONE (OUTPATIENT)
Dept: OBSTETRICS AND GYNECOLOGY | Facility: CLINIC | Age: 27
End: 2022-04-25
Payer: COMMERCIAL

## 2022-04-25 NOTE — TELEPHONE ENCOUNTER
----- Message from Sahara Reyes sent at 4/25/2022  8:37 AM CDT -----  Pt is calling to schedule an appt for spotting that has turned into heavy bleeding  Pt denies the soonest appt of 6/9  Pt can be contacted at [phone]

## 2022-04-26 ENCOUNTER — OFFICE VISIT (OUTPATIENT)
Dept: OBSTETRICS AND GYNECOLOGY | Facility: CLINIC | Age: 27
End: 2022-04-26
Attending: OBSTETRICS & GYNECOLOGY
Payer: COMMERCIAL

## 2022-04-26 ENCOUNTER — PATIENT MESSAGE (OUTPATIENT)
Dept: INTERNAL MEDICINE | Facility: CLINIC | Age: 27
End: 2022-04-26
Payer: COMMERCIAL

## 2022-04-26 VITALS
SYSTOLIC BLOOD PRESSURE: 120 MMHG | DIASTOLIC BLOOD PRESSURE: 70 MMHG | HEIGHT: 66 IN | BODY MASS INDEX: 30.29 KG/M2 | WEIGHT: 188.5 LBS

## 2022-04-26 DIAGNOSIS — N93.9 ABNORMAL UTERINE BLEEDING: Primary | ICD-10-CM

## 2022-04-26 DIAGNOSIS — Z11.3 SCREENING FOR VENEREAL DISEASE: ICD-10-CM

## 2022-04-26 PROBLEM — Z87.42 HISTORY OF IRREGULAR MENSTRUAL CYCLES: Status: RESOLVED | Noted: 2020-10-23 | Resolved: 2022-04-26

## 2022-04-26 LAB
B-HCG UR QL: NEGATIVE
CTP QC/QA: YES

## 2022-04-26 PROCEDURE — 81025 POCT URINE PREGNANCY: ICD-10-PCS | Mod: S$GLB,,, | Performed by: OBSTETRICS & GYNECOLOGY

## 2022-04-26 PROCEDURE — 99214 OFFICE O/P EST MOD 30 MIN: CPT | Mod: S$GLB,,, | Performed by: OBSTETRICS & GYNECOLOGY

## 2022-04-26 PROCEDURE — 1159F PR MEDICATION LIST DOCUMENTED IN MEDICAL RECORD: ICD-10-PCS | Mod: CPTII,S$GLB,, | Performed by: OBSTETRICS & GYNECOLOGY

## 2022-04-26 PROCEDURE — 1159F MED LIST DOCD IN RCRD: CPT | Mod: CPTII,S$GLB,, | Performed by: OBSTETRICS & GYNECOLOGY

## 2022-04-26 PROCEDURE — 81025 URINE PREGNANCY TEST: CPT | Mod: S$GLB,,, | Performed by: OBSTETRICS & GYNECOLOGY

## 2022-04-26 PROCEDURE — 99999 PR PBB SHADOW E&M-EST. PATIENT-LVL III: CPT | Mod: PBBFAC,,, | Performed by: OBSTETRICS & GYNECOLOGY

## 2022-04-26 PROCEDURE — 3078F PR MOST RECENT DIASTOLIC BLOOD PRESSURE < 80 MM HG: ICD-10-PCS | Mod: CPTII,S$GLB,, | Performed by: OBSTETRICS & GYNECOLOGY

## 2022-04-26 PROCEDURE — 99214 PR OFFICE/OUTPT VISIT, EST, LEVL IV, 30-39 MIN: ICD-10-PCS | Mod: S$GLB,,, | Performed by: OBSTETRICS & GYNECOLOGY

## 2022-04-26 PROCEDURE — 87491 CHLMYD TRACH DNA AMP PROBE: CPT | Mod: 59 | Performed by: OBSTETRICS & GYNECOLOGY

## 2022-04-26 PROCEDURE — 3078F DIAST BP <80 MM HG: CPT | Mod: CPTII,S$GLB,, | Performed by: OBSTETRICS & GYNECOLOGY

## 2022-04-26 PROCEDURE — 3074F SYST BP LT 130 MM HG: CPT | Mod: CPTII,S$GLB,, | Performed by: OBSTETRICS & GYNECOLOGY

## 2022-04-26 PROCEDURE — 99999 PR PBB SHADOW E&M-EST. PATIENT-LVL III: ICD-10-PCS | Mod: PBBFAC,,, | Performed by: OBSTETRICS & GYNECOLOGY

## 2022-04-26 PROCEDURE — 87481 CANDIDA DNA AMP PROBE: CPT | Mod: 59 | Performed by: OBSTETRICS & GYNECOLOGY

## 2022-04-26 PROCEDURE — 3008F BODY MASS INDEX DOCD: CPT | Mod: CPTII,S$GLB,, | Performed by: OBSTETRICS & GYNECOLOGY

## 2022-04-26 PROCEDURE — 1160F PR REVIEW ALL MEDS BY PRESCRIBER/CLIN PHARMACIST DOCUMENTED: ICD-10-PCS | Mod: CPTII,S$GLB,, | Performed by: OBSTETRICS & GYNECOLOGY

## 2022-04-26 PROCEDURE — 1160F RVW MEDS BY RX/DR IN RCRD: CPT | Mod: CPTII,S$GLB,, | Performed by: OBSTETRICS & GYNECOLOGY

## 2022-04-26 PROCEDURE — 3008F PR BODY MASS INDEX (BMI) DOCUMENTED: ICD-10-PCS | Mod: CPTII,S$GLB,, | Performed by: OBSTETRICS & GYNECOLOGY

## 2022-04-26 PROCEDURE — 87591 N.GONORRHOEAE DNA AMP PROB: CPT | Performed by: OBSTETRICS & GYNECOLOGY

## 2022-04-26 PROCEDURE — 3074F PR MOST RECENT SYSTOLIC BLOOD PRESSURE < 130 MM HG: ICD-10-PCS | Mod: CPTII,S$GLB,, | Performed by: OBSTETRICS & GYNECOLOGY

## 2022-04-26 RX ORDER — MEDROXYPROGESTERONE ACETATE 10 MG/1
10 TABLET ORAL DAILY
Qty: 30 TABLET | Refills: 0 | Status: SHIPPED | OUTPATIENT
Start: 2022-04-26 | End: 2022-05-20

## 2022-04-26 NOTE — PROGRESS NOTES
SUBJECTIVE:   26 y.o. female   for AUB. Patient's last menstrual period was 2022..  Reported anemia and thought heavy periods were contributing. Eligioalex placed  for AUB and contraception.  Had + CT treated . Initially had spotting after IUD inserted, then no periods.  Amenorrhea thru  until now.  Bleeding started about 9 days ago.  Changing pads 2-3 times/ daily. BRB and clots.  Concerned that period is going on for this long.  And concerned IUD is a problem.  CBC  shows resolved anemia.  Sexually active.         Past Medical History:   Diagnosis Date    Anxiety     Class 1 obesity 2021    Cluster headaches     Depression     Migraines      Past Surgical History:   Procedure Laterality Date    NO PAST SURGERIES       Social History     Socioeconomic History    Marital status: Single   Tobacco Use    Smoking status: Never Smoker    Smokeless tobacco: Never Used   Substance and Sexual Activity    Alcohol use: Yes     Comment: social    Drug use: Never    Sexual activity: Yes     Birth control/protection: Condom, I.U.D.     Comment: [place] 2020     Social Determinants of Health     Financial Resource Strain: Low Risk     Difficulty of Paying Living Expenses: Not hard at all   Food Insecurity: No Food Insecurity    Worried About Running Out of Food in the Last Year: Never true    Ran Out of Food in the Last Year: Never true   Transportation Needs: No Transportation Needs    Lack of Transportation (Medical): No    Lack of Transportation (Non-Medical): No   Physical Activity: Insufficiently Active    Days of Exercise per Week: 4 days    Minutes of Exercise per Session: 10 min   Stress: Stress Concern Present    Feeling of Stress : Rather much   Social Connections: Unknown    Frequency of Communication with Friends and Family: Three times a week    Frequency of Social Gatherings with Friends and Family: Once a week    Active Member of Clubs or  Organizations: Yes    Attends Club or Organization Meetings: More than 4 times per year    Marital Status: Never    Housing Stability: Low Risk     Unable to Pay for Housing in the Last Year: No    Number of Places Lived in the Last Year: 1    Unstable Housing in the Last Year: No     Family History   Problem Relation Age of Onset    Anemia Maternal Grandfather     Cancer Paternal Grandfather     Anemia Paternal Aunt     Breast cancer Neg Hx     Colon cancer Neg Hx     Ovarian cancer Neg Hx      OB History    Para Term  AB Living   0 0 0 0 0 0   SAB IAB Ectopic Multiple Live Births   0 0 0 0 0         Current Outpatient Medications   Medication Sig Dispense Refill    cyclobenzaprine (FLEXERIL) 10 MG tablet Take 1 tablet (10 mg total) by mouth nightly as needed for Muscle spasms (tension headache). 30 tablet 0    hydrOXYzine HCL (ATARAX) 25 MG tablet Take 1 tablet (25 mg total) by mouth 4 (four) times daily as needed for Anxiety. 60 tablet 0    sumatriptan (IMITREX) 100 MG tablet TAKE 1 TABLET BY MOUTH AT START OF HEADACHE MAY REPEAT AFTER 2 HOURS 20 tablet 1    venlafaxine (EFFEXOR) 37.5 MG Tab TAKE 1 TABLET BY MOUTH TWICE A  tablet 3    medroxyPROGESTERone (PROVERA) 10 MG tablet Take 1 tablet (10 mg total) by mouth once daily. 30 tablet 0     No current facility-administered medications for this visit.     Allergies: Amoxicillin, Keflex [cephalexin], Pertussis vaccines, and Sulfa (sulfonamide antibiotics)     ROS:  Constitutional: no weight loss, weight gain, fever, fatigue  Eyes:  No vision changes, glasses/contacts  ENT/Mouth: No ulcers, sinus problems, ears ringing, headache  Cardiovascular: No inability to lie flat, chest pain, exercise intolerance, swelling, heart palpitations  Respiratory: No wheezing, coughing blood, shortness of breath, or cough  Gastrointestinal: No diarrhea, bloody stool, nausea/vomiting, constipation, gas, hemorrhoids  Genitourinary: No blood  "in urine, painful urination, urgency of urination, frequency of urination, incomplete emptying, incontinence, +abnormal bleeding, painful periods, heavy periods, vaginal discharge, vaginal odor, painful intercourse, sexual problems, bleeding after intercourse.  Musculoskeletal: No muscle weakness  Skin/Breast: No painful breasts, nipple discharge, masses, rash, ulcers  Neurological: No passing out, seizures, numbness, headache  Endocrine: No diabetes, hypothyroid, hyperthyroid, hot flashes, hair loss, abnormal hair growth, ance  Psychiatric: No depression, crying  Hematologic: No bruises, bleeding, swollen lymph nodes, anemia.      OBJECTIVE:   The patient appears well, alert, oriented x 3, in no distress.  /70   Ht 5' 6" (1.676 m)   Wt 85.5 kg (188 lb 7.9 oz)   LMP 04/17/2022   BMI 30.42 kg/m²   ABDOMEN: no hernias, masses, or hepatosplenomegaly  GENITALIA: normal external genitalia, no erythema, bloody discharge  URETHRA: normal urethra, normal urethral meatus  VAGINA: Normal  CERVIX: no lesions or cervical motion tenderness. IUD strings seen  UTERUS: normal size, contour, position, consistency, mobility, non-tender  ADNEXA: no mass, fullness, tenderness      ASSESSMENT:   1. Abnormal uterine bleeding  POCT urine pregnancy    US Pelvis Comp with Transvag NON-OB (xpd   2. Screening for venereal disease  C. trachomatis/N. gonorrhoeae by AMP DNA    Vaginosis Screen by DNA Probe       PLAN:   Orders Placed This Encounter    C. trachomatis/N. gonorrhoeae by AMP DNA    Vaginosis Screen by DNA Probe    US Pelvis Comp with Transvag NON-OB (xpd    POCT urine pregnancy    medroxyPROGESTERone (PROVERA) 10 MG tablet     Discussed bleeding profile on Kyleena.  Overall- bleeding has improved with Kyleena.  Amenorrhea for over a year, but now with extended AUB.  Anemia resolved.  Discussed STD screen  U/S to check IUD placement  Trial of oral Provera for 30 days.  Return to clinic for WWE    "

## 2022-04-27 LAB
BACTERIAL VAGINOSIS DNA: POSITIVE
CANDIDA GLABRATA DNA: NEGATIVE
CANDIDA KRUSEI DNA: NEGATIVE
CANDIDA RRNA VAG QL PROBE: NEGATIVE
T VAGINALIS RRNA GENITAL QL PROBE: NEGATIVE

## 2022-04-28 ENCOUNTER — TELEPHONE (OUTPATIENT)
Dept: OBSTETRICS AND GYNECOLOGY | Facility: CLINIC | Age: 27
End: 2022-04-28
Payer: COMMERCIAL

## 2022-04-28 LAB
C TRACH DNA SPEC QL NAA+PROBE: DETECTED
N GONORRHOEA DNA SPEC QL NAA+PROBE: NOT DETECTED

## 2022-04-28 RX ORDER — AZITHROMYCIN 500 MG/1
1000 TABLET, FILM COATED ORAL ONCE
Qty: 2 TABLET | Refills: 0 | Status: SHIPPED | OUTPATIENT
Start: 2022-04-28 | End: 2022-04-28

## 2022-07-16 ENCOUNTER — PATIENT MESSAGE (OUTPATIENT)
Dept: INTERNAL MEDICINE | Facility: CLINIC | Age: 27
End: 2022-07-16
Payer: COMMERCIAL

## 2022-08-23 ENCOUNTER — PATIENT MESSAGE (OUTPATIENT)
Dept: INTERNAL MEDICINE | Facility: CLINIC | Age: 27
End: 2022-08-23
Payer: COMMERCIAL

## 2022-08-23 ENCOUNTER — LAB VISIT (OUTPATIENT)
Dept: LAB | Facility: OTHER | Age: 27
End: 2022-08-23
Attending: STUDENT IN AN ORGANIZED HEALTH CARE EDUCATION/TRAINING PROGRAM
Payer: COMMERCIAL

## 2022-08-23 DIAGNOSIS — D50.0 IRON DEFICIENCY ANEMIA DUE TO CHRONIC BLOOD LOSS: ICD-10-CM

## 2022-08-23 LAB
BASOPHILS # BLD AUTO: 0.08 K/UL (ref 0–0.2)
BASOPHILS NFR BLD: 0.7 % (ref 0–1.9)
DIFFERENTIAL METHOD: NORMAL
EOSINOPHIL # BLD AUTO: 0.2 K/UL (ref 0–0.5)
EOSINOPHIL NFR BLD: 1.3 % (ref 0–8)
ERYTHROCYTE [DISTWIDTH] IN BLOOD BY AUTOMATED COUNT: 12.6 % (ref 11.5–14.5)
FERRITIN SERPL-MCNC: 68 NG/ML (ref 20–300)
HCT VFR BLD AUTO: 41.8 % (ref 37–48.5)
HGB BLD-MCNC: 14 G/DL (ref 12–16)
IMM GRANULOCYTES # BLD AUTO: 0.04 K/UL (ref 0–0.04)
IMM GRANULOCYTES NFR BLD AUTO: 0.4 % (ref 0–0.5)
IRON SERPL-MCNC: 80 UG/DL (ref 30–160)
LYMPHOCYTES # BLD AUTO: 4 K/UL (ref 1–4.8)
LYMPHOCYTES NFR BLD: 35.3 % (ref 18–48)
MCH RBC QN AUTO: 29.9 PG (ref 27–31)
MCHC RBC AUTO-ENTMCNC: 33.5 G/DL (ref 32–36)
MCV RBC AUTO: 89 FL (ref 82–98)
MONOCYTES # BLD AUTO: 0.7 K/UL (ref 0.3–1)
MONOCYTES NFR BLD: 6.2 % (ref 4–15)
NEUTROPHILS # BLD AUTO: 6.3 K/UL (ref 1.8–7.7)
NEUTROPHILS NFR BLD: 56.1 % (ref 38–73)
NRBC BLD-RTO: 0 /100 WBC
PLATELET # BLD AUTO: 321 K/UL (ref 150–450)
PMV BLD AUTO: 10.4 FL (ref 9.2–12.9)
RBC # BLD AUTO: 4.69 M/UL (ref 4–5.4)
SATURATED IRON: 19 % (ref 20–50)
TOTAL IRON BINDING CAPACITY: 426 UG/DL (ref 250–450)
TRANSFERRIN SERPL-MCNC: 288 MG/DL (ref 200–375)
WBC # BLD AUTO: 11.27 K/UL (ref 3.9–12.7)

## 2022-08-23 PROCEDURE — 82728 ASSAY OF FERRITIN: CPT | Performed by: STUDENT IN AN ORGANIZED HEALTH CARE EDUCATION/TRAINING PROGRAM

## 2022-08-23 PROCEDURE — 84466 ASSAY OF TRANSFERRIN: CPT | Performed by: STUDENT IN AN ORGANIZED HEALTH CARE EDUCATION/TRAINING PROGRAM

## 2022-08-23 PROCEDURE — 36415 COLL VENOUS BLD VENIPUNCTURE: CPT | Performed by: STUDENT IN AN ORGANIZED HEALTH CARE EDUCATION/TRAINING PROGRAM

## 2022-08-23 PROCEDURE — 85025 COMPLETE CBC W/AUTO DIFF WBC: CPT | Performed by: STUDENT IN AN ORGANIZED HEALTH CARE EDUCATION/TRAINING PROGRAM

## 2022-08-25 ENCOUNTER — OFFICE VISIT (OUTPATIENT)
Dept: HEMATOLOGY/ONCOLOGY | Facility: CLINIC | Age: 27
End: 2022-08-25
Payer: COMMERCIAL

## 2022-08-25 DIAGNOSIS — E61.1 IRON DEFICIENCY: ICD-10-CM

## 2022-08-25 PROCEDURE — 1159F MED LIST DOCD IN RCRD: CPT | Mod: CPTII,95,, | Performed by: STUDENT IN AN ORGANIZED HEALTH CARE EDUCATION/TRAINING PROGRAM

## 2022-08-25 PROCEDURE — 1160F RVW MEDS BY RX/DR IN RCRD: CPT | Mod: CPTII,95,, | Performed by: STUDENT IN AN ORGANIZED HEALTH CARE EDUCATION/TRAINING PROGRAM

## 2022-08-25 PROCEDURE — 99213 OFFICE O/P EST LOW 20 MIN: CPT | Mod: 95,,, | Performed by: STUDENT IN AN ORGANIZED HEALTH CARE EDUCATION/TRAINING PROGRAM

## 2022-08-25 PROCEDURE — 1159F PR MEDICATION LIST DOCUMENTED IN MEDICAL RECORD: ICD-10-PCS | Mod: CPTII,95,, | Performed by: STUDENT IN AN ORGANIZED HEALTH CARE EDUCATION/TRAINING PROGRAM

## 2022-08-25 PROCEDURE — 99213 PR OFFICE/OUTPT VISIT, EST, LEVL III, 20-29 MIN: ICD-10-PCS | Mod: 95,,, | Performed by: STUDENT IN AN ORGANIZED HEALTH CARE EDUCATION/TRAINING PROGRAM

## 2022-08-25 PROCEDURE — 1160F PR REVIEW ALL MEDS BY PRESCRIBER/CLIN PHARMACIST DOCUMENTED: ICD-10-PCS | Mod: CPTII,95,, | Performed by: STUDENT IN AN ORGANIZED HEALTH CARE EDUCATION/TRAINING PROGRAM

## 2022-08-25 RX ORDER — SODIUM CHLORIDE 0.9 % (FLUSH) 0.9 %
10 SYRINGE (ML) INJECTION
Status: CANCELLED | OUTPATIENT
Start: 2022-08-25

## 2022-08-25 RX ORDER — SODIUM CHLORIDE 9 MG/ML
INJECTION, SOLUTION INTRAVENOUS CONTINUOUS
Status: CANCELLED | OUTPATIENT
Start: 2022-08-25

## 2022-08-25 RX ORDER — HEPARIN 100 UNIT/ML
5 SYRINGE INTRAVENOUS
Status: CANCELLED | OUTPATIENT
Start: 2022-08-25

## 2022-08-25 NOTE — PROGRESS NOTES
Hematology- Oncology Clinic Note :     The patient location is: [place]  The chief complaint leading to consultation is: see chief complaint below    Visit type: audiovisual    Face to Face time with patient: 10  20 minutes of total time spent on the encounter, which includes face to face time and non-face to face time preparing to see the patient (eg, review of tests), Obtaining and/or reviewing separately obtained history, Documenting clinical information in the electronic or other health record, Independently interpreting results (not separately reported) and communicating results to the patient/family/caregiver, or Care coordination (not separately reported).         Each patient to whom he or she provides medical services by telemedicine is:  (1) informed of the relationship between the physician and patient and the respective role of any other health care provider with respect to management of the patient; and (2) notified that he or she may decline to receive medical services by telemedicine and may withdraw from such care at any time.    RFV / chief complaint- Anemia        HPI  Pt is a 26 y.o. female who  has a past medical history of Anxiety, Class 1 obesity (7/8/2021), Cluster headaches, Depression, and Migraines.   Pt presents to the clinic today for evaluation and management of iron deficiency.    Doing well. Fatigue improved. HMB better since IUD Dec 2020. 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  Family lives in a different state.  Patient denies any smoking or illicit drug use.  Drinks a couple of times a month.  Family history-maternal grand father and maternal aunt with HHT and paternal grandfather with gastric cancer      Reviewed past medical/surgical/social history    Past Medical History:    Diagnosis Date    Anxiety     Class 1 obesity 7/8/2021    Cluster headaches     Depression     Migraines       Past Surgical History:   Procedure Laterality Date    NO PAST SURGERIES        (Not in a hospital admission)    Review of patient's allergies indicates:   Allergen Reactions    Amoxicillin Other (See Comments)    Keflex [cephalexin] Other (See Comments)    Pertussis vaccines Other (See Comments)    Sulfa (sulfonamide antibiotics) Other (See Comments)      Social History     Tobacco Use    Smoking status: Never Smoker    Smokeless tobacco: Never Used   Substance Use Topics    Alcohol use: Yes     Comment: social      Family History   Problem Relation Age of Onset    Anemia Maternal Grandfather     Cancer Paternal Grandfather     Anemia Paternal Aunt     Breast cancer Neg Hx     Colon cancer Neg Hx     Ovarian cancer Neg Hx           Review of Systems :  Review of Systems   Constitutional: Positive for malaise/fatigue. Negative for chills, diaphoresis, fever and weight loss.   HENT: Negative.  Negative for congestion, hearing loss, nosebleeds, sore throat and tinnitus.    Eyes: Negative.  Negative for blurred vision and discharge.   Respiratory: Negative for cough, hemoptysis, sputum production, shortness of breath and wheezing.    Cardiovascular: Negative.  Negative for chest pain, palpitations and leg swelling.   Gastrointestinal: Negative.  Negative for abdominal pain, blood in stool, constipation, diarrhea, heartburn, melena, nausea and vomiting.   Musculoskeletal: Negative.  Negative for back pain, falls, joint pain and myalgias.   Skin: Negative.  Negative for itching and rash.   Neurological: Negative.  Negative for dizziness, tingling, sensory change, speech change, focal weakness, seizures, loss of consciousness, weakness and headaches.   Endo/Heme/Allergies: Negative.  Does not bruise/bleed easily.   Psychiatric/Behavioral: Positive for depression. The patient is not  nervous/anxious and does not have insomnia.                Physical Exam :  There were no vitals taken for this visit.  Wt Readings from Last 3 Encounters:   04/26/22 85.5 kg (188 lb 7.9 oz)   02/03/22 89.8 kg (197 lb 15.6 oz)   07/08/21 89.3 kg (196 lb 14.4 oz)       There is no height or weight on file to calculate BMI.      Physical Exam  Constitutional:       General: She is not in acute distress.     Appearance: Normal appearance. She is not ill-appearing.   HENT:      Head: Normocephalic and atraumatic.      Right Ear: External ear normal.      Left Ear: External ear normal.      Mouth/Throat:      Pharynx: No oropharyngeal exudate.   Eyes:      General: No scleral icterus.  Pulmonary:      Effort: Pulmonary effort is normal. No respiratory distress.   Neurological:      Mental Status: She is alert and oriented to person, place, and time. Mental status is at baseline.   Psychiatric:         Behavior: Behavior normal.             Current Outpatient Medications   Medication Sig Dispense Refill    cyclobenzaprine (FLEXERIL) 10 MG tablet Take 1 tablet (10 mg total) by mouth nightly as needed for Muscle spasms (tension headache). 30 tablet 0    hydrOXYzine HCL (ATARAX) 25 MG tablet Take 1 tablet (25 mg total) by mouth 4 (four) times daily as needed for Anxiety. 60 tablet 0    medroxyPROGESTERone (PROVERA) 10 MG tablet TAKE 1 TABLET BY MOUTH EVERY DAY 90 tablet 1    sumatriptan (IMITREX) 100 MG tablet TAKE 1 TABLET BY MOUTH AT START OF HEADACHE MAY REPEAT AFTER 2 HOURS 20 tablet 1    venlafaxine (EFFEXOR) 37.5 MG Tab TAKE 1 TABLET BY MOUTH TWICE A  tablet 3     No current facility-administered medications for this visit.       Pertinent Diagnostic studies:      Lab Visit on 08/23/2022   Component Date Value Ref Range Status    WBC 08/23/2022 11.27  3.90 - 12.70 K/uL Final    RBC 08/23/2022 4.69  4.00 - 5.40 M/uL Final    Hemoglobin 08/23/2022 14.0  12.0 - 16.0 g/dL Final    Hematocrit 08/23/2022 41.8   37.0 - 48.5 % Final    MCV 08/23/2022 89  82 - 98 fL Final    MCH 08/23/2022 29.9  27.0 - 31.0 pg Final    MCHC 08/23/2022 33.5  32.0 - 36.0 g/dL Final    RDW 08/23/2022 12.6  11.5 - 14.5 % Final    Platelets 08/23/2022 321  150 - 450 K/uL Final    MPV 08/23/2022 10.4  9.2 - 12.9 fL Final    Immature Granulocytes 08/23/2022 0.4  0.0 - 0.5 % Final    Gran # (ANC) 08/23/2022 6.3  1.8 - 7.7 K/uL Final    Immature Grans (Abs) 08/23/2022 0.04  0.00 - 0.04 K/uL Final    Comment: Mild elevation in immature granulocytes is non specific and   can be seen in a variety of conditions including stress response,   acute inflammation, trauma and pregnancy. Correlation with other   laboratory and clinical findings is essential.      Lymph # 08/23/2022 4.0  1.0 - 4.8 K/uL Final    Mono # 08/23/2022 0.7  0.3 - 1.0 K/uL Final    Eos # 08/23/2022 0.2  0.0 - 0.5 K/uL Final    Baso # 08/23/2022 0.08  0.00 - 0.20 K/uL Final    nRBC 08/23/2022 0  0 /100 WBC Final    Gran % 08/23/2022 56.1  38.0 - 73.0 % Final    Lymph % 08/23/2022 35.3  18.0 - 48.0 % Final    Mono % 08/23/2022 6.2  4.0 - 15.0 % Final    Eosinophil % 08/23/2022 1.3  0.0 - 8.0 % Final    Basophil % 08/23/2022 0.7  0.0 - 1.9 % Final    Differential Method 08/23/2022 Automated   Final    Ferritin 08/23/2022 68  20.0 - 300.0 ng/mL Final    Iron 08/23/2022 80  30 - 160 ug/dL Final    Transferrin 08/23/2022 288  200 - 375 mg/dL Final    TIBC 08/23/2022 426  250 - 450 ug/dL Final    Saturated Iron 08/23/2022 19 (A) 20 - 50 % Final         Oncology History    No history exists.     Assessment :       1. Iron deficiency        Plan :     Dietary iron deficiency continues despite taking iron supplements and control of HMB.   Hb wnl. tsat <20. Ferritin <100.   Will order one dose of injectafer and repeat labs in 1 year      History of irregular menstrual cycles  Has hormonal IUD in place  Improvement in heavy menstrual cycles    No problem-specific Assessment  & Plan notes found for this encounter.      Problem List Items Addressed This Visit     Iron deficiency    Relevant Orders    Hemoglobin    Ferritin            Electronically signed by Char Nieves          Future Appointments   Date Time Provider Department Center   9/1/2022  2:15 PM Romina Juares MD [facility]   10/27/2022  2:30 PM Jazz Leiva MD [id number]

## 2022-08-29 ENCOUNTER — PATIENT MESSAGE (OUTPATIENT)
Dept: INTERNAL MEDICINE | Facility: CLINIC | Age: 27
End: 2022-08-29
Payer: COMMERCIAL

## 2022-09-01 ENCOUNTER — OFFICE VISIT (OUTPATIENT)
Dept: INTERNAL MEDICINE | Facility: CLINIC | Age: 27
End: 2022-09-01
Payer: COMMERCIAL

## 2022-09-01 VITALS
OXYGEN SATURATION: 98 % | DIASTOLIC BLOOD PRESSURE: 80 MMHG | WEIGHT: 194.13 LBS | HEIGHT: 66 IN | HEART RATE: 103 BPM | SYSTOLIC BLOOD PRESSURE: 130 MMHG | TEMPERATURE: 99 F | BODY MASS INDEX: 31.2 KG/M2

## 2022-09-01 DIAGNOSIS — F41.9 ANXIETY AND DEPRESSION: ICD-10-CM

## 2022-09-01 DIAGNOSIS — F32.A ANXIETY AND DEPRESSION: ICD-10-CM

## 2022-09-01 DIAGNOSIS — F33.1 MDD (MAJOR DEPRESSIVE DISORDER), RECURRENT EPISODE, MODERATE: ICD-10-CM

## 2022-09-01 DIAGNOSIS — G43.009 MIGRAINE WITHOUT AURA AND WITHOUT STATUS MIGRAINOSUS, NOT INTRACTABLE: Primary | ICD-10-CM

## 2022-09-01 DIAGNOSIS — H92.09 OTALGIA, UNSPECIFIED LATERALITY: ICD-10-CM

## 2022-09-01 PROCEDURE — 3079F DIAST BP 80-89 MM HG: CPT | Mod: CPTII,S$GLB,, | Performed by: INTERNAL MEDICINE

## 2022-09-01 PROCEDURE — 99999 PR PBB SHADOW E&M-EST. PATIENT-LVL III: CPT | Mod: PBBFAC,,, | Performed by: INTERNAL MEDICINE

## 2022-09-01 PROCEDURE — 1159F PR MEDICATION LIST DOCUMENTED IN MEDICAL RECORD: ICD-10-PCS | Mod: CPTII,S$GLB,, | Performed by: INTERNAL MEDICINE

## 2022-09-01 PROCEDURE — 99214 OFFICE O/P EST MOD 30 MIN: CPT | Mod: S$GLB,,, | Performed by: INTERNAL MEDICINE

## 2022-09-01 PROCEDURE — 1160F PR REVIEW ALL MEDS BY PRESCRIBER/CLIN PHARMACIST DOCUMENTED: ICD-10-PCS | Mod: CPTII,S$GLB,, | Performed by: INTERNAL MEDICINE

## 2022-09-01 PROCEDURE — 3008F BODY MASS INDEX DOCD: CPT | Mod: CPTII,S$GLB,, | Performed by: INTERNAL MEDICINE

## 2022-09-01 PROCEDURE — 3075F PR MOST RECENT SYSTOLIC BLOOD PRESS GE 130-139MM HG: ICD-10-PCS | Mod: CPTII,S$GLB,, | Performed by: INTERNAL MEDICINE

## 2022-09-01 PROCEDURE — 99999 PR PBB SHADOW E&M-EST. PATIENT-LVL III: ICD-10-PCS | Mod: PBBFAC,,, | Performed by: INTERNAL MEDICINE

## 2022-09-01 PROCEDURE — 3008F PR BODY MASS INDEX (BMI) DOCUMENTED: ICD-10-PCS | Mod: CPTII,S$GLB,, | Performed by: INTERNAL MEDICINE

## 2022-09-01 PROCEDURE — 1160F RVW MEDS BY RX/DR IN RCRD: CPT | Mod: CPTII,S$GLB,, | Performed by: INTERNAL MEDICINE

## 2022-09-01 PROCEDURE — 3075F SYST BP GE 130 - 139MM HG: CPT | Mod: CPTII,S$GLB,, | Performed by: INTERNAL MEDICINE

## 2022-09-01 PROCEDURE — 99214 PR OFFICE/OUTPT VISIT, EST, LEVL IV, 30-39 MIN: ICD-10-PCS | Mod: S$GLB,,, | Performed by: INTERNAL MEDICINE

## 2022-09-01 PROCEDURE — 1159F MED LIST DOCD IN RCRD: CPT | Mod: CPTII,S$GLB,, | Performed by: INTERNAL MEDICINE

## 2022-09-01 PROCEDURE — 3079F PR MOST RECENT DIASTOLIC BLOOD PRESSURE 80-89 MM HG: ICD-10-PCS | Mod: CPTII,S$GLB,, | Performed by: INTERNAL MEDICINE

## 2022-09-01 RX ORDER — PROMETHAZINE HYDROCHLORIDE 12.5 MG/1
12.5 TABLET ORAL EVERY 6 HOURS PRN
Qty: 90 TABLET | Refills: 1 | Status: SHIPPED | OUTPATIENT
Start: 2022-09-01

## 2022-09-01 RX ORDER — AZITHROMYCIN 250 MG/1
TABLET, FILM COATED ORAL
Qty: 6 TABLET | Refills: 0 | Status: SHIPPED | OUTPATIENT
Start: 2022-09-01 | End: 2022-09-06

## 2022-09-01 NOTE — PROGRESS NOTES
[facility]    Chief Complaint      Chief Complaint   Patient presents with    Follow-up     6 month f/u    Headache    Otalgia     Earache in both      History of Present Illness      Anabel Frost is a 26 y.o. female who presents today for chief complaint follow up headaches and depression .      HPI   Sp Ed [facility]BERT [facility]endano in [facility] seeing Shanti Ghosh LPC   Had 2 migraines this mo which is usual frequency but there were more debilitating than usual, improvement with triptan but more intense at onset and in terms of associated symptoms symptoms and sound and light sensitivity, not necc thunderclap or the worst headache of her life but near to it   Heme Onc is scheduling iron infusion   Saw gyn for DUB and was given porgesterone but feels like it was not evaluated   Has been having recurrent  otalgia, has sinus congestion and post nasal drip, some improvement on claritin and flonase     Active Problem List with Overview Notes    Diagnosis Date Noted    Otalgia 09/01/2022    Iron deficiency 08/25/2022    Tension headache 02/06/2022    Class 1 obesity 07/08/2021    MDD (major depressive disorder), recurrent episode, moderate 07/08/2021    Dietary iron deficiency 10/23/2020    Family history of hereditary hemorrhagic telangiectasia 10/23/2020     Maternal GF and maternal aunt      Fatigue 10/15/2020    Obstructive sleep apnea 10/15/2020    Migraine without aura and without status migrainosus, not intractable 07/16/2020     Usually monthly, photophobia and nausea   imitrex is abortive       Anxiety and depression 07/16/2020     Has been on ssri for 3 years, recently increased to 20 mg, better controlled          Health Maintenance   Topic Date Due    HPV Vaccines (1 - 2-dose series) Never done    TETANUS VACCINE  Never done    Pap Smear  06/08/2023    Hepatitis C Screening  Completed    Lipid Panel  Completed       Past Medical History:   Diagnosis Date    Anxiety     Class 1 obesity  2021    Cluster headaches     Depression     Migraines        Past Surgical History:   Procedure Laterality Date    NO PAST SURGERIES         family history includes Anemia in her maternal grandfather and paternal aunt; Cancer in her paternal grandfather; Depression in her mother.    Social History     Tobacco Use    Smoking status: Never    Smokeless tobacco: Never   Substance Use Topics    Alcohol use: Not Currently     Comment: social    Drug use: Never       Review of Systems   Constitutional:  Negative for chills, fever, malaise/fatigue and weight loss.   HENT:  Positive for ear pain.    Respiratory:  Negative for cough, sputum production, shortness of breath and wheezing.    Cardiovascular:  Negative for chest pain, palpitations, orthopnea and leg swelling.   Gastrointestinal:  Negative for constipation, diarrhea, nausea and vomiting.   Genitourinary:  Negative for dysuria, frequency, hematuria and urgency.   Neurological:  Positive for headaches.      Outpatient Encounter Medications as of 2022   Medication Sig Dispense Refill    [] azithromycin (Z-AMANDA) 250 MG tablet Take 2 tablets by mouth on day 1; Take 1 tablet by mouth on days 2-5 6 tablet 0    cyclobenzaprine (FLEXERIL) 10 MG tablet Take 1 tablet (10 mg total) by mouth nightly as needed for Muscle spasms (tension headache). 30 tablet 0    promethazine (PHENERGAN) 12.5 MG Tab Take 1 tablet (12.5 mg total) by mouth every 6 (six) hours as needed (nausea). 90 tablet 1    sumatriptan (IMITREX) 100 MG tablet TAKE 1 TABLET BY MOUTH AT START OF HEADACHE MAY REPEAT AFTER 2 HOURS 20 tablet 1    venlafaxine (EFFEXOR) 37.5 MG Tab TAKE 1 TABLET BY MOUTH TWICE A  tablet 3    [DISCONTINUED] hydrOXYzine HCL (ATARAX) 25 MG tablet Take 1 tablet (25 mg total) by mouth 4 (four) times daily as needed for Anxiety. 60 tablet 0    [DISCONTINUED] medroxyPROGESTERone (PROVERA) 10 MG tablet TAKE 1 TABLET BY MOUTH EVERY DAY 90 tablet 1     No  "facility-administered encounter medications on file as of 9/1/2022.        Review of patient's allergies indicates:   Allergen Reactions    Amoxicillin Other (See Comments)    Keflex [cephalexin] Other (See Comments)     crying    Pertussis vaccines Other (See Comments)    Sulfa (sulfonamide antibiotics) Other (See Comments)           Physical Exam      Vital Signs  Temp: 99 °F (37.2 °C)  Temp src: Oral  Pulse: 103  SpO2: 98 %  BP: 130/80  BP Location: Right arm  Patient Position: Sitting  Pain Score: 0-No pain  Height and Weight  Height: 5' 6" (167.6 cm)  Weight: 88 kg (194 lb 1.8 oz)  BSA (Calculated - sq m): 2.02 sq meters  BMI (Calculated): 31.3  Weight in (lb) to have BMI = 25: 154.6]    Physical Exam  Vitals reviewed.   Constitutional:       General: She is not in acute distress.     Appearance: She is well-developed. She is not diaphoretic.   HENT:      Head: Normocephalic and atraumatic.      Right Ear: External ear normal.      Left Ear: External ear normal.      Nose: Nose normal.   Eyes:      General:         Right eye: No discharge.         Left eye: No discharge.      Conjunctiva/sclera: Conjunctivae normal.   Pulmonary:      Effort: Pulmonary effort is normal. No respiratory distress.   Musculoskeletal:         General: Normal range of motion.      Cervical back: Normal range of motion.   Skin:     Coloration: Skin is not pale.      Findings: No rash.   Neurological:      Mental Status: She is alert and oriented to person, place, and time.   Psychiatric:         Behavior: Behavior normal.         Thought Content: Thought content normal.        Laboratory:  CBC:  Recent Labs   Lab Result Units 08/23/22  1113   WBC K/uL 11.27   RBC M/uL 4.69   Hemoglobin g/dL 14.0   Hematocrit % 41.8   Platelets K/uL 321   MCV fL 89   MCH pg 29.9   MCHC g/dL 33.5     CMP:  No results for input(s): GLU, CALCIUM, ALBUMIN, PROT, NA, K, CO2, CL, BUN, ALKPHOS, ALT, AST, BILITOT in the last 2160 hours.    Invalid input(s): " CREATININ  URINALYSIS:  No results for input(s): COLORU, CLARITYU, SPECGRAV, PHUR, PROTEINUA, GLUCOSEU, BILIRUBINCON, BLOODU, WBCU, RBCU, BACTERIA, MUCUS, NITRITE, LEUKOCYTESUR, UROBILINOGEN, HYALINECASTS in the last 2160 hours.   LIPIDS:  No results for input(s): TSH, HDL, CHOL, TRIG, LDLCALC, CHOLHDL, NONHDLCHOL, TOTALCHOLEST in the last 2160 hours.  TSH:  No results for input(s): TSH in the last 2160 hours.  A1C:  No results for input(s): HGBA1C in the last 2160 hours.        Assessment/Plan     Anabel Frost is a 26 y.o.female with:    1. Migraine without aura and without status migrainosus, not intractable  Overview:  Usually monthly, photophobia and nausea   imitrex is abortive     Assessment & Plan:  If continue to have at current pattern consider imaging and ppx medication     Orders:  -     promethazine (PHENERGAN) 12.5 MG Tab    2. Otalgia, unspecified laterality  -     azithromycin (Z-AMANDA) 250 MG tablet    3. MDD (major depressive disorder), recurrent episode, moderate  Assessment & Plan:  Stable, managed       4. Anxiety and depression  Overview:  Has been on ssri for 3 years, recently increased to 20 mg, better controlled     Assessment & Plan:  Stable  Letter of accommodation for school written          Use of the [employer] Patient Portal discussed and encouraged during today's visit  -Continue current medications and maintain follow up with specialists.  Return to clinic in .  Future Appointments   Date Time Provider Department Center   10/27/2022  2:30 PM Jazz Leiva MD [id number]   3/1/2023  1:15 PM Romina Juares MD [facility]       Romina Juares MD  9/10/2022 2:40 PM    Primary Care Internal Medicine

## 2022-09-02 ENCOUNTER — TELEPHONE (OUTPATIENT)
Dept: INFUSION THERAPY | Facility: OTHER | Age: 27
End: 2022-09-02
Payer: COMMERCIAL

## 2022-09-02 NOTE — TELEPHONE ENCOUNTER
Returned pt's call. No answer. Left VM with callback number.      ----- Message from Hollie Fraser RN sent at 9/1/2022  2:56 PM CDT -----    ----- Message -----  From: Romina Juares MD  Sent: 9/1/2022   2:52 PM CDT  To: Lizbeth Pardo Staff    Please call pt to schedule iron infusion

## 2022-09-06 ENCOUNTER — PATIENT MESSAGE (OUTPATIENT)
Dept: INTERNAL MEDICINE | Facility: CLINIC | Age: 27
End: 2022-09-06
Payer: COMMERCIAL

## 2022-09-13 ENCOUNTER — PATIENT MESSAGE (OUTPATIENT)
Dept: INTERNAL MEDICINE | Facility: CLINIC | Age: 27
End: 2022-09-13
Payer: COMMERCIAL

## 2022-09-14 ENCOUNTER — PATIENT MESSAGE (OUTPATIENT)
Dept: INTERNAL MEDICINE | Facility: CLINIC | Age: 27
End: 2022-09-14
Payer: COMMERCIAL

## 2022-09-14 DIAGNOSIS — F33.1 MDD (MAJOR DEPRESSIVE DISORDER), RECURRENT EPISODE, MODERATE: ICD-10-CM

## 2022-09-16 ENCOUNTER — PATIENT MESSAGE (OUTPATIENT)
Dept: HEMATOLOGY/ONCOLOGY | Facility: CLINIC | Age: 27
End: 2022-09-16
Payer: COMMERCIAL

## 2022-09-22 ENCOUNTER — INFUSION (OUTPATIENT)
Dept: INFUSION THERAPY | Facility: OTHER | Age: 27
End: 2022-09-22
Attending: STUDENT IN AN ORGANIZED HEALTH CARE EDUCATION/TRAINING PROGRAM
Payer: COMMERCIAL

## 2022-09-22 VITALS
TEMPERATURE: 98 F | HEART RATE: 100 BPM | DIASTOLIC BLOOD PRESSURE: 81 MMHG | OXYGEN SATURATION: 96 % | RESPIRATION RATE: 17 BRPM | SYSTOLIC BLOOD PRESSURE: 126 MMHG

## 2022-09-22 DIAGNOSIS — E61.1 IRON DEFICIENCY: Primary | ICD-10-CM

## 2022-09-22 PROCEDURE — 63600175 PHARM REV CODE 636 W HCPCS: Mod: JG | Performed by: STUDENT IN AN ORGANIZED HEALTH CARE EDUCATION/TRAINING PROGRAM

## 2022-09-22 PROCEDURE — 96365 THER/PROPH/DIAG IV INF INIT: CPT

## 2022-09-22 PROCEDURE — 25000003 PHARM REV CODE 250: Performed by: STUDENT IN AN ORGANIZED HEALTH CARE EDUCATION/TRAINING PROGRAM

## 2022-09-22 RX ORDER — SODIUM CHLORIDE 9 MG/ML
INJECTION, SOLUTION INTRAVENOUS CONTINUOUS
OUTPATIENT
Start: 2022-09-29

## 2022-09-22 RX ORDER — SODIUM CHLORIDE 0.9 % (FLUSH) 0.9 %
10 SYRINGE (ML) INJECTION
Status: DISCONTINUED | OUTPATIENT
Start: 2022-09-22 | End: 2022-09-22 | Stop reason: HOSPADM

## 2022-09-22 RX ORDER — SODIUM CHLORIDE 0.9 % (FLUSH) 0.9 %
10 SYRINGE (ML) INJECTION
OUTPATIENT
Start: 2022-09-29

## 2022-09-22 RX ORDER — HEPARIN 100 UNIT/ML
5 SYRINGE INTRAVENOUS
OUTPATIENT
Start: 2022-09-29

## 2022-09-22 RX ORDER — HEPARIN 100 UNIT/ML
5 SYRINGE INTRAVENOUS
Status: DISCONTINUED | OUTPATIENT
Start: 2022-09-22 | End: 2022-09-22 | Stop reason: HOSPADM

## 2022-09-22 RX ADMIN — SODIUM CHLORIDE: 0.9 INJECTION, SOLUTION INTRAVENOUS at 02:09

## 2022-09-22 RX ADMIN — FERRIC CARBOXYMALTOSE INJECTION 750 MG: 50 INJECTION, SOLUTION INTRAVENOUS at 02:09

## 2022-11-01 ENCOUNTER — PATIENT MESSAGE (OUTPATIENT)
Dept: INTERNAL MEDICINE | Facility: CLINIC | Age: 27
End: 2022-11-01
Payer: COMMERCIAL

## 2022-11-01 DIAGNOSIS — H60.333 ACUTE SWIMMER'S EAR OF BOTH SIDES: ICD-10-CM

## 2022-11-01 RX ORDER — NEOMYCIN SULFATE, POLYMYXIN B SULFATE, HYDROCORTISONE 3.5; 10000; 1 MG/ML; [USP'U]/ML; MG/ML
3 SOLUTION/ DROPS AURICULAR (OTIC) EVERY 8 HOURS
Qty: 10 ML | Refills: 0 | Status: SHIPPED | OUTPATIENT
Start: 2022-11-01 | End: 2022-11-06

## 2022-11-21 ENCOUNTER — PATIENT MESSAGE (OUTPATIENT)
Dept: INTERNAL MEDICINE | Facility: CLINIC | Age: 27
End: 2022-11-21
Payer: COMMERCIAL

## 2022-11-21 DIAGNOSIS — F33.1 MDD (MAJOR DEPRESSIVE DISORDER), RECURRENT EPISODE, MODERATE: ICD-10-CM

## 2022-11-22 RX ORDER — VENLAFAXINE 75 MG/1
75 TABLET ORAL 2 TIMES DAILY
Qty: 180 TABLET | Refills: 1 | Status: SHIPPED | OUTPATIENT
Start: 2022-11-22 | End: 2023-05-30 | Stop reason: SDUPTHER

## 2022-12-08 ENCOUNTER — PATIENT MESSAGE (OUTPATIENT)
Dept: INTERNAL MEDICINE | Facility: CLINIC | Age: 27
End: 2022-12-08
Payer: COMMERCIAL

## 2022-12-08 DIAGNOSIS — G47.33 OBSTRUCTIVE SLEEP APNEA: Primary | ICD-10-CM

## 2023-01-02 ENCOUNTER — PATIENT MESSAGE (OUTPATIENT)
Dept: INTERNAL MEDICINE | Facility: CLINIC | Age: 28
End: 2023-01-02
Payer: COMMERCIAL

## 2023-01-13 ENCOUNTER — OFFICE VISIT (OUTPATIENT)
Dept: OBSTETRICS AND GYNECOLOGY | Facility: CLINIC | Age: 28
End: 2023-01-13
Payer: COMMERCIAL

## 2023-01-13 VITALS
HEIGHT: 65 IN | WEIGHT: 196 LBS | SYSTOLIC BLOOD PRESSURE: 132 MMHG | DIASTOLIC BLOOD PRESSURE: 84 MMHG | BODY MASS INDEX: 32.65 KG/M2

## 2023-01-13 DIAGNOSIS — Z12.39 BREAST CANCER SCREENING OTHER THAN MAMMOGRAM: ICD-10-CM

## 2023-01-13 DIAGNOSIS — Z30.431 ENCOUNTER FOR ROUTINE CHECKING OF INTRAUTERINE CONTRACEPTIVE DEVICE (IUD): ICD-10-CM

## 2023-01-13 DIAGNOSIS — Z01.419 ENCOUNTER FOR ANNUAL ROUTINE GYNECOLOGICAL EXAMINATION: Primary | ICD-10-CM

## 2023-01-13 DIAGNOSIS — Z11.3 SCREEN FOR STD (SEXUALLY TRANSMITTED DISEASE): ICD-10-CM

## 2023-01-13 DIAGNOSIS — N64.52 BREAST DISCHARGE: ICD-10-CM

## 2023-01-13 PROCEDURE — 99999 PR PBB SHADOW E&M-EST. PATIENT-LVL III: CPT | Mod: PBBFAC,,, | Performed by: OBSTETRICS & GYNECOLOGY

## 2023-01-13 PROCEDURE — 3075F SYST BP GE 130 - 139MM HG: CPT | Mod: CPTII,S$GLB,, | Performed by: OBSTETRICS & GYNECOLOGY

## 2023-01-13 PROCEDURE — 3075F PR MOST RECENT SYSTOLIC BLOOD PRESS GE 130-139MM HG: ICD-10-PCS | Mod: CPTII,S$GLB,, | Performed by: OBSTETRICS & GYNECOLOGY

## 2023-01-13 PROCEDURE — 1160F PR REVIEW ALL MEDS BY PRESCRIBER/CLIN PHARMACIST DOCUMENTED: ICD-10-PCS | Mod: CPTII,S$GLB,, | Performed by: OBSTETRICS & GYNECOLOGY

## 2023-01-13 PROCEDURE — 1159F PR MEDICATION LIST DOCUMENTED IN MEDICAL RECORD: ICD-10-PCS | Mod: CPTII,S$GLB,, | Performed by: OBSTETRICS & GYNECOLOGY

## 2023-01-13 PROCEDURE — 87591 N.GONORRHOEAE DNA AMP PROB: CPT | Performed by: OBSTETRICS & GYNECOLOGY

## 2023-01-13 PROCEDURE — 81514 NFCT DS BV&VAGINITIS DNA ALG: CPT | Performed by: OBSTETRICS & GYNECOLOGY

## 2023-01-13 PROCEDURE — 99395 PR PREVENTIVE VISIT,EST,18-39: ICD-10-PCS | Mod: S$GLB,,, | Performed by: OBSTETRICS & GYNECOLOGY

## 2023-01-13 PROCEDURE — 99395 PREV VISIT EST AGE 18-39: CPT | Mod: S$GLB,,, | Performed by: OBSTETRICS & GYNECOLOGY

## 2023-01-13 PROCEDURE — 1160F RVW MEDS BY RX/DR IN RCRD: CPT | Mod: CPTII,S$GLB,, | Performed by: OBSTETRICS & GYNECOLOGY

## 2023-01-13 PROCEDURE — 88141 CYTOPATH C/V INTERPRET: CPT | Mod: ,,, | Performed by: PATHOLOGY

## 2023-01-13 PROCEDURE — 3079F DIAST BP 80-89 MM HG: CPT | Mod: CPTII,S$GLB,, | Performed by: OBSTETRICS & GYNECOLOGY

## 2023-01-13 PROCEDURE — 3079F PR MOST RECENT DIASTOLIC BLOOD PRESSURE 80-89 MM HG: ICD-10-PCS | Mod: CPTII,S$GLB,, | Performed by: OBSTETRICS & GYNECOLOGY

## 2023-01-13 PROCEDURE — 1159F MED LIST DOCD IN RCRD: CPT | Mod: CPTII,S$GLB,, | Performed by: OBSTETRICS & GYNECOLOGY

## 2023-01-13 PROCEDURE — 88175 CYTOPATH C/V AUTO FLUID REDO: CPT | Performed by: PATHOLOGY

## 2023-01-13 PROCEDURE — 87624 HPV HI-RISK TYP POOLED RSLT: CPT | Performed by: OBSTETRICS & GYNECOLOGY

## 2023-01-13 PROCEDURE — 3008F BODY MASS INDEX DOCD: CPT | Mod: CPTII,S$GLB,, | Performed by: OBSTETRICS & GYNECOLOGY

## 2023-01-13 PROCEDURE — 99999 PR PBB SHADOW E&M-EST. PATIENT-LVL III: ICD-10-PCS | Mod: PBBFAC,,, | Performed by: OBSTETRICS & GYNECOLOGY

## 2023-01-13 PROCEDURE — 3008F PR BODY MASS INDEX (BMI) DOCUMENTED: ICD-10-PCS | Mod: CPTII,S$GLB,, | Performed by: OBSTETRICS & GYNECOLOGY

## 2023-01-13 PROCEDURE — 88141 PR  CYTOPATH CERV/VAG INTERPRET: ICD-10-PCS | Mod: ,,, | Performed by: PATHOLOGY

## 2023-01-13 RX ORDER — VENLAFAXINE 37.5 MG/1
37.5 TABLET ORAL 2 TIMES DAILY
COMMUNITY
Start: 2022-12-20 | End: 2023-01-13 | Stop reason: DRUGHIGH

## 2023-01-13 NOTE — PROGRESS NOTES
Chief Complaint: Well Woman Exam     HPI:      Anabel Frost is a 27 y.o.  who presents for annual exam. She is currently complaining of  green nipple discharge from the left breast for the past 6 months that occurs spontaneously and with breast stimulation.  Denies breast masses, lumps, or skin changes .    Ms. Frost is not currently sexually active. She would like STD screening today. Patient does not have regular monthly menses. No LMP recorded (lmp unknown). Patient has had an implant. She is currently using IUD for contraception.    Previous Pap:  no abnormalities (2020)  Previous Mammogram:  No results found for this or any previous visit.  Most Recent Dexa: not indicated  Colonoscopy: never had    COVID vaccine: completed series, needs Omicron  Gardasil:Completed     Patient Active Problem List   Diagnosis    Migraine without aura and without status migrainosus, not intractable    Anxiety and depression    Fatigue    Obstructive sleep apnea    Dietary iron deficiency    Family history of hereditary hemorrhagic telangiectasia    Class 1 obesity    MDD (major depressive disorder), recurrent episode, moderate    Tension headache    Iron deficiency    Otalgia       Past Medical History:   Diagnosis Date    Anxiety     Class 1 obesity 2021    Cluster headaches     Depression     Migraines        Past Surgical History:   Procedure Laterality Date    NO PAST SURGERIES         OB History          0    Para   0    Term   0       0    AB   0    Living   0         SAB   0    IAB   0    Ectopic   0    Multiple   0    Live Births   0                 ROS:     Review of Systems   Constitutional:  Negative for activity change, appetite change and fatigue.   Respiratory:  Negative for shortness of breath.    Cardiovascular:  Negative for chest pain.   Gastrointestinal:  Negative for abdominal pain, constipation and diarrhea.   Endocrine: Negative for cold intolerance and heat intolerance.  "  Genitourinary:  Negative for dysuria, frequency, menstrual irregularity, pelvic pain and vaginal discharge.   Integumentary:  Positive for breast discharge. Negative for breast mass and breast tenderness.   Psychiatric/Behavioral:  Negative for dysphoric mood. The patient is not nervous/anxious.    Breast: Negative for mass and tenderness    Physical Exam:      PHYSICAL EXAM:  /84   Ht 5' 5" (1.651 m)   Wt 88.9 kg (195 lb 15.8 oz)   LMP  (LMP Unknown) Comment: IUD  BMI 32.61 kg/m²   Body mass index is 32.61 kg/m².     APPEARANCE: Well nourished, well developed, in no acute distress.  PSYCH: Appropriate mood and affect.  SKIN: No acne or hirsutism  NECK: Neck symmetric without masses or thyromegaly  NODES: No inguinal, axillary, or supraclavicular lymph node enlargement  CHEST: Normal respiratory effort.  ABDOMEN: Soft.  No tenderness or masses.   BREASTS: Symmetrical, no skin changes or visible lesions.  No palpable masses or nipple discharge bilaterally.  PELVIC: Normal external genitalia without lesions.  Normal hair distribution.  Adequate perineal body, normal urethral meatus.  Vagina moist and well rugated without lesions or discharge.  Cervix pink, without lesions, discharge or tenderness. IUD strings at os. No significant cystocele or rectocele.  Bimanual exam shows uterus to be normal size, regular, mobile and nontender.  Adnexa without masses or tenderness.    EXTREMITIES: No edema.    Assessment:     1. Encounter for annual routine gynecological examination  Liquid-Based Pap Smear, Screening    CBC Auto Differential    Comprehensive Metabolic Panel    TSH    Hemoglobin A1C    Lipid Panel      2. Encounter for routine checking of intrauterine contraceptive device (IUD)        3. Breast cancer screening other than mammogram        4. Breast discharge  US Breast Left Limited      5. Screen for STD (sexually transmitted disease)  C. trachomatis/N. gonorrhoeae by AMP DNA    Vaginosis Screen by DNA " Probe            Plan:     Clinical breast exam performed.  Pap collected.  Mammogram at 40 or as needed.  Breast US ordered for nipple discharge.  DEXA at 65 or as needed.  Colonoscopy at 45 or as needed.  Contraception: Kyleena placed 12/2020.  Follow up in about 1 year (around 1/13/2024) for annual well woman exam or as needed.    Counseling:     Patient was counseled today on current ASCCP pap guidelines, the recommendation for yearly pelvic exams, healthy diet and exercise routines, breast self awareness. She is to see her PCP for other health maintenance.       Use of the [employer] Patient Portal discussed and encouraged during today's visit.         Jazz Leiva MD  [facility]  01/13/2023

## 2023-01-13 NOTE — PATIENT INSTRUCTIONS
Please check out the [employer] College of Obst[employer]ians [employer]nec[employer] PATIENT WEBSITE.  The site has education materials, patient stories, expert views, and a portal for you to ask questions.       [email or web address]      As always, please let me know if you have any questions.     Dr. Jazz Leiva

## 2023-01-14 LAB
C TRACH DNA SPEC QL NAA+PROBE: NOT DETECTED
N GONORRHOEA DNA SPEC QL NAA+PROBE: NOT DETECTED

## 2023-01-19 DIAGNOSIS — B96.89 BV (BACTERIAL VAGINOSIS): Primary | ICD-10-CM

## 2023-01-19 DIAGNOSIS — N76.0 BV (BACTERIAL VAGINOSIS): Primary | ICD-10-CM

## 2023-01-19 RX ORDER — METRONIDAZOLE 7.5 MG/G
1 GEL VAGINAL NIGHTLY
Qty: 5 APPLICATOR | Refills: 0 | Status: SHIPPED | OUTPATIENT
Start: 2023-01-19 | End: 2023-01-24

## 2023-01-23 LAB
FINAL PATHOLOGIC DIAGNOSIS: ABNORMAL
Lab: ABNORMAL

## 2023-01-25 LAB
HPV HR 12 DNA SPEC QL NAA+PROBE: NEGATIVE
HPV16 AG SPEC QL: NEGATIVE
HPV18 DNA SPEC QL NAA+PROBE: NEGATIVE

## 2023-02-06 ENCOUNTER — HOSPITAL ENCOUNTER (OUTPATIENT)
Dept: RADIOLOGY | Facility: OTHER | Age: 28
Discharge: HOME | End: 2023-02-06
Attending: OBSTETRICS & GYNECOLOGY
Payer: COMMERCIAL

## 2023-02-06 DIAGNOSIS — N64.52 BREAST DISCHARGE: ICD-10-CM

## 2023-02-06 PROCEDURE — 76642 ULTRASOUND BREAST LIMITED: CPT | Mod: 26,LT,, | Performed by: RADIOLOGY

## 2023-02-06 PROCEDURE — 76642 ULTRASOUND BREAST LIMITED: CPT | Mod: TC,LT

## 2023-02-06 PROCEDURE — 76642 US BREAST LEFT LIMITED: ICD-10-PCS | Mod: 26,LT,, | Performed by: RADIOLOGY

## 2023-02-10 DIAGNOSIS — N64.52 NIPPLE DISCHARGE: Primary | ICD-10-CM

## 2023-02-14 ENCOUNTER — PATIENT MESSAGE (OUTPATIENT)
Dept: OBSTETRICS AND GYNECOLOGY | Facility: CLINIC | Age: 28
End: 2023-02-14
Payer: COMMERCIAL

## 2023-02-15 ENCOUNTER — TELEPHONE (OUTPATIENT)
Dept: SURGERY | Facility: CLINIC | Age: 28
End: 2023-02-15
Payer: COMMERCIAL

## 2023-02-20 ENCOUNTER — PATIENT MESSAGE (OUTPATIENT)
Dept: OBSTETRICS AND GYNECOLOGY | Facility: CLINIC | Age: 28
End: 2023-02-20
Payer: COMMERCIAL

## 2023-02-20 ENCOUNTER — LAB VISIT (OUTPATIENT)
Dept: LAB | Facility: OTHER | Age: 28
End: 2023-02-20
Attending: OBSTETRICS & GYNECOLOGY
Payer: COMMERCIAL

## 2023-02-20 DIAGNOSIS — Z01.419 ENCOUNTER FOR ANNUAL ROUTINE GYNECOLOGICAL EXAMINATION: ICD-10-CM

## 2023-02-20 LAB
ALBUMIN SERPL BCP-MCNC: 4.1 G/DL (ref 3.5–5.2)
ALP SERPL-CCNC: 84 U/L (ref 55–135)
ALT SERPL W/O P-5'-P-CCNC: 52 U/L (ref 10–44)
ANION GAP SERPL CALC-SCNC: 7 MMOL/L (ref 8–16)
AST SERPL-CCNC: 33 U/L (ref 10–40)
BASOPHILS # BLD AUTO: 0.07 K/UL (ref 0–0.2)
BASOPHILS NFR BLD: 0.8 % (ref 0–1.9)
BILIRUB SERPL-MCNC: 0.3 MG/DL (ref 0.1–1)
BUN SERPL-MCNC: 14 MG/DL (ref 6–20)
CALCIUM SERPL-MCNC: 9.3 MG/DL (ref 8.7–10.5)
CHLORIDE SERPL-SCNC: 106 MMOL/L (ref 95–110)
CHOLEST SERPL-MCNC: 211 MG/DL (ref 120–199)
CHOLEST/HDLC SERPL: 4.3 {RATIO} (ref 2–5)
CO2 SERPL-SCNC: 26 MMOL/L (ref 23–29)
CREAT SERPL-MCNC: 0.8 MG/DL (ref 0.5–1.4)
DIFFERENTIAL METHOD: ABNORMAL
EOSINOPHIL # BLD AUTO: 0.3 K/UL (ref 0–0.5)
EOSINOPHIL NFR BLD: 3 % (ref 0–8)
ERYTHROCYTE [DISTWIDTH] IN BLOOD BY AUTOMATED COUNT: 12.4 % (ref 11.5–14.5)
EST. GFR  (NO RACE VARIABLE): >60 ML/MIN/1.73 M^2
ESTIMATED AVG GLUCOSE: 97 MG/DL (ref 68–131)
GLUCOSE SERPL-MCNC: 97 MG/DL (ref 70–110)
HBA1C MFR BLD: 5 % (ref 4–5.6)
HCT VFR BLD AUTO: 43.9 % (ref 37–48.5)
HDLC SERPL-MCNC: 49 MG/DL (ref 40–75)
HDLC SERPL: 23.2 % (ref 20–50)
HGB BLD-MCNC: 14.3 G/DL (ref 12–16)
IMM GRANULOCYTES # BLD AUTO: 0.07 K/UL (ref 0–0.04)
IMM GRANULOCYTES NFR BLD AUTO: 0.8 % (ref 0–0.5)
LDLC SERPL CALC-MCNC: 131.6 MG/DL (ref 63–159)
LYMPHOCYTES # BLD AUTO: 3.7 K/UL (ref 1–4.8)
LYMPHOCYTES NFR BLD: 41.2 % (ref 18–48)
MCH RBC QN AUTO: 30.3 PG (ref 27–31)
MCHC RBC AUTO-ENTMCNC: 32.6 G/DL (ref 32–36)
MCV RBC AUTO: 93 FL (ref 82–98)
MONOCYTES # BLD AUTO: 0.7 K/UL (ref 0.3–1)
MONOCYTES NFR BLD: 7.6 % (ref 4–15)
NEUTROPHILS # BLD AUTO: 4.2 K/UL (ref 1.8–7.7)
NEUTROPHILS NFR BLD: 46.6 % (ref 38–73)
NONHDLC SERPL-MCNC: 162 MG/DL
NRBC BLD-RTO: 0 /100 WBC
PLATELET # BLD AUTO: 278 K/UL (ref 150–450)
PMV BLD AUTO: 10 FL (ref 9.2–12.9)
POTASSIUM SERPL-SCNC: 4.3 MMOL/L (ref 3.5–5.1)
PROT SERPL-MCNC: 7.5 G/DL (ref 6–8.4)
RBC # BLD AUTO: 4.72 M/UL (ref 4–5.4)
SODIUM SERPL-SCNC: 139 MMOL/L (ref 136–145)
TRIGL SERPL-MCNC: 152 MG/DL (ref 30–150)
TSH SERPL DL<=0.005 MIU/L-ACNC: 1.81 UIU/ML (ref 0.4–4)
WBC # BLD AUTO: 8.95 K/UL (ref 3.9–12.7)

## 2023-02-20 PROCEDURE — 85025 COMPLETE CBC W/AUTO DIFF WBC: CPT | Performed by: OBSTETRICS & GYNECOLOGY

## 2023-02-20 PROCEDURE — 80053 COMPREHEN METABOLIC PANEL: CPT | Performed by: OBSTETRICS & GYNECOLOGY

## 2023-02-20 PROCEDURE — 36415 COLL VENOUS BLD VENIPUNCTURE: CPT | Performed by: OBSTETRICS & GYNECOLOGY

## 2023-02-20 PROCEDURE — 80061 LIPID PANEL: CPT | Performed by: OBSTETRICS & GYNECOLOGY

## 2023-02-20 PROCEDURE — 84443 ASSAY THYROID STIM HORMONE: CPT | Performed by: OBSTETRICS & GYNECOLOGY

## 2023-02-20 PROCEDURE — 83036 HEMOGLOBIN GLYCOSYLATED A1C: CPT | Performed by: OBSTETRICS & GYNECOLOGY

## 2023-02-22 DIAGNOSIS — R74.01 ELEVATED ALANINE AMINOTRANSFERASE (ALT) LEVEL: Primary | ICD-10-CM

## 2023-02-23 ENCOUNTER — TELEPHONE (OUTPATIENT)
Dept: SURGERY | Facility: CLINIC | Age: 28
End: 2023-02-23
Payer: COMMERCIAL

## 2023-03-01 ENCOUNTER — LAB VISIT (OUTPATIENT)
Dept: LAB | Facility: HOSPITAL | Age: 28
End: 2023-03-01
Attending: INTERNAL MEDICINE
Payer: COMMERCIAL

## 2023-03-01 ENCOUNTER — OFFICE VISIT (OUTPATIENT)
Dept: PRIMARY CARE CLINIC | Facility: CLINIC | Age: 28
End: 2023-03-01
Payer: COMMERCIAL

## 2023-03-01 VITALS
HEIGHT: 65 IN | OXYGEN SATURATION: 98 % | HEART RATE: 98 BPM | BODY MASS INDEX: 33.46 KG/M2 | SYSTOLIC BLOOD PRESSURE: 124 MMHG | DIASTOLIC BLOOD PRESSURE: 74 MMHG | WEIGHT: 200.81 LBS

## 2023-03-01 DIAGNOSIS — F33.1 MDD (MAJOR DEPRESSIVE DISORDER), RECURRENT EPISODE, MODERATE: ICD-10-CM

## 2023-03-01 DIAGNOSIS — N64.3 GALACTORRHEA: ICD-10-CM

## 2023-03-01 DIAGNOSIS — E61.1 IRON DEFICIENCY: ICD-10-CM

## 2023-03-01 DIAGNOSIS — F32.A ANXIETY AND DEPRESSION: ICD-10-CM

## 2023-03-01 DIAGNOSIS — R74.8 ELEVATED LIVER ENZYMES: ICD-10-CM

## 2023-03-01 DIAGNOSIS — F41.9 ANXIETY AND DEPRESSION: ICD-10-CM

## 2023-03-01 DIAGNOSIS — Z00.00 WELLNESS EXAMINATION: Primary | ICD-10-CM

## 2023-03-01 DIAGNOSIS — G47.33 OBSTRUCTIVE SLEEP APNEA: ICD-10-CM

## 2023-03-01 PROCEDURE — 3008F PR BODY MASS INDEX (BMI) DOCUMENTED: ICD-10-PCS | Mod: CPTII,S$GLB,, | Performed by: INTERNAL MEDICINE

## 2023-03-01 PROCEDURE — 3074F SYST BP LT 130 MM HG: CPT | Mod: CPTII,S$GLB,, | Performed by: INTERNAL MEDICINE

## 2023-03-01 PROCEDURE — 3044F PR MOST RECENT HEMOGLOBIN A1C LEVEL <7.0%: ICD-10-PCS | Mod: CPTII,S$GLB,, | Performed by: INTERNAL MEDICINE

## 2023-03-01 PROCEDURE — 84146 ASSAY OF PROLACTIN: CPT | Performed by: INTERNAL MEDICINE

## 2023-03-01 PROCEDURE — 99395 PREV VISIT EST AGE 18-39: CPT | Mod: S$GLB,,, | Performed by: INTERNAL MEDICINE

## 2023-03-01 PROCEDURE — 36415 COLL VENOUS BLD VENIPUNCTURE: CPT | Performed by: INTERNAL MEDICINE

## 2023-03-01 PROCEDURE — 82728 ASSAY OF FERRITIN: CPT | Performed by: INTERNAL MEDICINE

## 2023-03-01 PROCEDURE — 3008F BODY MASS INDEX DOCD: CPT | Mod: CPTII,S$GLB,, | Performed by: INTERNAL MEDICINE

## 2023-03-01 PROCEDURE — 99395 PR PREVENTIVE VISIT,EST,18-39: ICD-10-PCS | Mod: S$GLB,,, | Performed by: INTERNAL MEDICINE

## 2023-03-01 PROCEDURE — 3044F HG A1C LEVEL LT 7.0%: CPT | Mod: CPTII,S$GLB,, | Performed by: INTERNAL MEDICINE

## 2023-03-01 PROCEDURE — 3074F PR MOST RECENT SYSTOLIC BLOOD PRESSURE < 130 MM HG: ICD-10-PCS | Mod: CPTII,S$GLB,, | Performed by: INTERNAL MEDICINE

## 2023-03-01 PROCEDURE — 99999 PR PBB SHADOW E&M-EST. PATIENT-LVL III: ICD-10-PCS | Mod: PBBFAC,,, | Performed by: INTERNAL MEDICINE

## 2023-03-01 PROCEDURE — 99999 PR PBB SHADOW E&M-EST. PATIENT-LVL III: CPT | Mod: PBBFAC,,, | Performed by: INTERNAL MEDICINE

## 2023-03-01 PROCEDURE — 1159F PR MEDICATION LIST DOCUMENTED IN MEDICAL RECORD: ICD-10-PCS | Mod: CPTII,S$GLB,, | Performed by: INTERNAL MEDICINE

## 2023-03-01 PROCEDURE — 80076 HEPATIC FUNCTION PANEL: CPT | Performed by: INTERNAL MEDICINE

## 2023-03-01 PROCEDURE — 1159F MED LIST DOCD IN RCRD: CPT | Mod: CPTII,S$GLB,, | Performed by: INTERNAL MEDICINE

## 2023-03-01 PROCEDURE — 3078F PR MOST RECENT DIASTOLIC BLOOD PRESSURE < 80 MM HG: ICD-10-PCS | Mod: CPTII,S$GLB,, | Performed by: INTERNAL MEDICINE

## 2023-03-01 PROCEDURE — 3078F DIAST BP <80 MM HG: CPT | Mod: CPTII,S$GLB,, | Performed by: INTERNAL MEDICINE

## 2023-03-01 NOTE — PROGRESS NOTES
[facility] Note    Chief Complaint      Chief Complaint   Patient presents with    Annual Exam     Fill out forms      History of Present Illness      Anabel Frost is a 27 y.o. female who presents today for chief complaint annual wellness .   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, she withdrew again this semester due to financial burden   -needs to fill out forms to reduce her insurance premium   -needs accommodations for doctors visits and flex time in the mornings     Anxiety and depression she is on venlafaxine, doing ok, lots of stress financially grad school work   GLORIA saw HO who wanted did IV Fe  Headaches   Saw gyn Cali   Ref;'d to breast surgery for galactorrhea   Pap had ascus with neg HPV   She is working on better habits and working towared weight loss   Active Problem List with Overview Notes    Diagnosis Date Noted    Otalgia 09/01/2022    Iron deficiency 08/25/2022    Tension headache 02/06/2022    Class 1 obesity 07/08/2021    MDD (major depressive disorder), recurrent episode, moderate 07/08/2021    Dietary iron deficiency 10/23/2020    Family history of hereditary hemorrhagic telangiectasia 10/23/2020     Maternal GF and maternal aunt      Fatigue 10/15/2020    Obstructive sleep apnea 10/15/2020    Migraine without aura and without status migrainosus, not intractable 07/16/2020     Usually monthly, photophobia and nausea   imitrex is abortive       Anxiety and depression 07/16/2020     Has been on ssri for 3 years, recently increased to 20 mg, better controlled          Health Maintenance   Topic Date Due    TETANUS VACCINE  Never done    Pap Smear  01/13/2026    Hepatitis C Screening  Completed    Lipid Panel  Completed       Past  Medical History:   Diagnosis Date    Anxiety     Class 1 obesity 7/8/2021    Cluster headaches     Depression     Migraines        Past Surgical History:   Procedure Laterality Date    NO PAST SURGERIES         family history includes Anemia in her maternal grandfather and paternal aunt; Cancer in her paternal grandfather; Depression in her mother.    Social History     Tobacco Use    Smoking status: Never    Smokeless tobacco: Never   Substance Use Topics    Alcohol use: Not Currently     Comment: social    Drug use: Never       Review of Systems   Constitutional:  Negative for chills, fever, malaise/fatigue and weight loss.   Respiratory:  Negative for cough, sputum production, shortness of breath and wheezing.    Cardiovascular:  Negative for chest pain, palpitations, orthopnea and leg swelling.   Gastrointestinal:  Negative for constipation, diarrhea, nausea and vomiting.   Genitourinary:  Negative for dysuria, frequency, hematuria and urgency.      Outpatient Encounter Medications as of 3/1/2023   Medication Sig Dispense Refill    cyclobenzaprine (FLEXERIL) 10 MG tablet Take 1 tablet (10 mg total) by mouth nightly as needed for Muscle spasms (tension headache). 30 tablet 0    levonorgestreL (KYLEENA) 17.5 mcg/24 hrs (5 yrs) 19.5 mg IUD 1 each by Intrauterine route once.      promethazine (PHENERGAN) 12.5 MG Tab Take 1 tablet (12.5 mg total) by mouth every 6 (six) hours as needed (nausea). 90 tablet 1    sumatriptan (IMITREX) 100 MG tablet TAKE 1 TABLET BY MOUTH AT START OF HEADACHE MAY REPEAT AFTER 2 HOURS 20 tablet 1    venlafaxine (EFFEXOR) 75 MG tablet Take 1 tablet (75 mg total) by mouth 2 (two) times daily. 180 tablet 1    [DISCONTINUED] sumatriptan (IMITREX) 100 MG tablet TAKE 1 TABLET BY MOUTH AT START OF HEADACHE MAY REPEAT AFTER 2 HOURS 20 tablet 1     No facility-administered encounter medications on file as of 3/1/2023.        Review of patient's allergies indicates:   Allergen Reactions     "Amoxicillin Other (See Comments)    Keflex [cephalexin] Other (See Comments)     crying    Pertussis vaccines Other (See Comments)    Sulfa (sulfonamide antibiotics) Other (See Comments)           Physical Exam      Vital Signs  Pulse: 98  SpO2: 98 %  BP: 124/74  BP Location: Left arm  Patient Position: Sitting  Height and Weight  Height: 5' 5" (165.1 cm)  Weight: 91.1 kg (200 lb 13.4 oz)  BSA (Calculated - sq m): 2.04 sq meters  BMI (Calculated): 33.4  Weight in (lb) to have BMI = 25: 149.9]    Physical Exam  Vitals reviewed.   Constitutional:       General: She is not in acute distress.     Appearance: She is well-developed. She is not diaphoretic.   HENT:      Head: Normocephalic and atraumatic.      Right Ear: External ear normal.      Left Ear: External ear normal.      Nose: Nose normal.   Eyes:      General:         Right eye: No discharge.         Left eye: No discharge.      Conjunctiva/sclera: Conjunctivae normal.   Cardiovascular:      Rate and Rhythm: Normal rate and regular rhythm.      Heart sounds: Normal heart sounds.   Pulmonary:      Effort: Pulmonary effort is normal. No respiratory distress.      Breath sounds: Normal breath sounds. No wheezing.   Abdominal:      General: There is no distension.      Tenderness: There is no abdominal tenderness.   Musculoskeletal:         General: Normal range of motion.      Cervical back: Normal range of motion.   Skin:     Coloration: Skin is not pale.      Findings: No rash.   Neurological:      Mental Status: She is alert and oriented to person, place, and time.   Psychiatric:         Behavior: Behavior normal.         Thought Content: Thought content normal.        Laboratory:  CBC:  Recent Labs   Lab Result Units 02/20/23  0815   WBC K/uL 8.95   RBC M/uL 4.72   Hemoglobin g/dL 14.3   Hematocrit % 43.9   Platelets K/uL 278   MCV fL 93   MCH pg 30.3   MCHC g/dL 32.6     CMP:  Recent Labs   Lab Result Units 02/20/23  0815 03/01/23  1427   Glucose mg/dL 97  -- "    Calcium mg/dL 9.3  --    Albumin g/dL 4.1 4.1   Total Protein g/dL 7.5 7.7   Sodium mmol/L 139  --    Potassium mmol/L 4.3  --    CO2 mmol/L 26  --    Chloride mmol/L 106  --    BUN mg/dL 14  --    Alkaline Phosphatase U/L 84 82   ALT U/L 52* 30   AST U/L 33 26   Total Bilirubin mg/dL 0.3 0.3     URINALYSIS:  No results for input(s): COLORU, CLARITYU, SPECGRAV, PHUR, PROTEINUA, GLUCOSEU, BILIRUBINCON, BLOODU, WBCU, RBCU, BACTERIA, MUCUS, NITRITE, LEUKOCYTESUR, UROBILINOGEN, HYALINECASTS in the last 2160 hours.   LIPIDS:  Recent Labs   Lab Result Units 02/20/23  0815   TSH uIU/mL 1.810   HDL mg/dL 49   Cholesterol mg/dL 211*   Triglycerides mg/dL 152*   LDL Cholesterol mg/dL 131.6   HDL/Cholesterol Ratio % 23.2   Non-HDL Cholesterol mg/dL 162   Total Cholesterol/HDL Ratio  4.3     TSH:  Recent Labs   Lab Result Units 02/20/23  0815   TSH uIU/mL 1.810     A1C:  Recent Labs   Lab Result Units 02/20/23  0815   Hemoglobin A1C % 5.0         Assessment/Plan     Anabel Frost is a 27 y.o.female with:    1. Wellness examination    2. Elevated liver enzymes  -     Hepatic function panel; Future; Expected date: 03/01/2023    3. Iron deficiency  -     FERRITIN; Future; Expected date: 03/01/2023    4. Galactorrhea  -     PROLACTIN; Future; Expected date: 03/01/2023    5. Anxiety and depression  Overview:  Has been on ssri for 3 years, recently increased to 20 mg, better controlled     Assessment & Plan:  Relatively stable       6. Obstructive sleep apnea  Assessment & Plan:  She has sleep study scheduled       7. MDD (major depressive disorder), recurrent episode, moderate  Assessment & Plan:  Stable            Use of the [facility] Patient Portal discussed and encouraged during today's visit  -Continue current medications and maintain follow up with specialists.  Return to clinic in 6 months .  Future Appointments   Date Time Provider Department Center   4/3/2023  1:30 PM Aliza Pérez NP [facility]    7/11/2023  2:30 PM Pepito Joshua MD [facility]       Romina Juares MD  3/20/2023 1:41 PM    Primary Care Internal Medicine

## 2023-03-02 LAB
ALBUMIN SERPL BCP-MCNC: 4.1 G/DL (ref 3.5–5.2)
ALP SERPL-CCNC: 82 U/L (ref 55–135)
ALT SERPL W/O P-5'-P-CCNC: 30 U/L (ref 10–44)
AST SERPL-CCNC: 26 U/L (ref 10–40)
BILIRUB DIRECT SERPL-MCNC: 0.1 MG/DL (ref 0.1–0.3)
BILIRUB SERPL-MCNC: 0.3 MG/DL (ref 0.1–1)
FERRITIN SERPL-MCNC: 271 NG/ML (ref 20–300)
PROLACTIN SERPL IA-MCNC: 11.9 NG/ML (ref 5.2–26.5)
PROT SERPL-MCNC: 7.7 G/DL (ref 6–8.4)

## 2023-03-06 ENCOUNTER — PATIENT MESSAGE (OUTPATIENT)
Dept: SURGERY | Facility: CLINIC | Age: 28
End: 2023-03-06
Payer: COMMERCIAL

## 2023-03-08 ENCOUNTER — PATIENT MESSAGE (OUTPATIENT)
Dept: PRIMARY CARE CLINIC | Facility: CLINIC | Age: 28
End: 2023-03-08
Payer: COMMERCIAL

## 2023-03-10 ENCOUNTER — PATIENT MESSAGE (OUTPATIENT)
Dept: PRIMARY CARE CLINIC | Facility: CLINIC | Age: 28
End: 2023-03-10
Payer: COMMERCIAL

## 2023-03-13 ENCOUNTER — PATIENT MESSAGE (OUTPATIENT)
Dept: PRIMARY CARE CLINIC | Facility: CLINIC | Age: 28
End: 2023-03-13
Payer: COMMERCIAL

## 2023-03-14 ENCOUNTER — PATIENT MESSAGE (OUTPATIENT)
Dept: PRIMARY CARE CLINIC | Facility: CLINIC | Age: 28
End: 2023-03-14
Payer: COMMERCIAL

## 2023-04-03 ENCOUNTER — OFFICE VISIT (OUTPATIENT)
Dept: SURGERY | Facility: CLINIC | Age: 28
End: 2023-04-03
Payer: COMMERCIAL

## 2023-04-03 VITALS
SYSTOLIC BLOOD PRESSURE: 126 MMHG | WEIGHT: 194 LBS | BODY MASS INDEX: 32.32 KG/M2 | DIASTOLIC BLOOD PRESSURE: 69 MMHG | OXYGEN SATURATION: 95 % | TEMPERATURE: 99 F | HEART RATE: 66 BPM | HEIGHT: 65 IN

## 2023-04-03 DIAGNOSIS — Z12.39 ENCOUNTER FOR SCREENING BREAST EXAMINATION: ICD-10-CM

## 2023-04-03 DIAGNOSIS — N64.52 NIPPLE DISCHARGE: Primary | ICD-10-CM

## 2023-04-03 PROCEDURE — 1159F MED LIST DOCD IN RCRD: CPT | Mod: CPTII,S$GLB,, | Performed by: NURSE PRACTITIONER

## 2023-04-03 PROCEDURE — 99203 OFFICE O/P NEW LOW 30 MIN: CPT | Mod: S$GLB,,, | Performed by: NURSE PRACTITIONER

## 2023-04-03 PROCEDURE — 99203 PR OFFICE/OUTPT VISIT, NEW, LEVL III, 30-44 MIN: ICD-10-PCS | Mod: S$GLB,,, | Performed by: NURSE PRACTITIONER

## 2023-04-03 PROCEDURE — 3008F PR BODY MASS INDEX (BMI) DOCUMENTED: ICD-10-PCS | Mod: CPTII,S$GLB,, | Performed by: NURSE PRACTITIONER

## 2023-04-03 PROCEDURE — 1159F PR MEDICATION LIST DOCUMENTED IN MEDICAL RECORD: ICD-10-PCS | Mod: CPTII,S$GLB,, | Performed by: NURSE PRACTITIONER

## 2023-04-03 PROCEDURE — 3044F HG A1C LEVEL LT 7.0%: CPT | Mod: CPTII,S$GLB,, | Performed by: NURSE PRACTITIONER

## 2023-04-03 PROCEDURE — 3074F SYST BP LT 130 MM HG: CPT | Mod: CPTII,S$GLB,, | Performed by: NURSE PRACTITIONER

## 2023-04-03 PROCEDURE — 3074F PR MOST RECENT SYSTOLIC BLOOD PRESSURE < 130 MM HG: ICD-10-PCS | Mod: CPTII,S$GLB,, | Performed by: NURSE PRACTITIONER

## 2023-04-03 PROCEDURE — 99999 PR PBB SHADOW E&M-EST. PATIENT-LVL IV: ICD-10-PCS | Mod: PBBFAC,,, | Performed by: NURSE PRACTITIONER

## 2023-04-03 PROCEDURE — 99999 PR PBB SHADOW E&M-EST. PATIENT-LVL IV: CPT | Mod: PBBFAC,,, | Performed by: NURSE PRACTITIONER

## 2023-04-03 PROCEDURE — 3078F PR MOST RECENT DIASTOLIC BLOOD PRESSURE < 80 MM HG: ICD-10-PCS | Mod: CPTII,S$GLB,, | Performed by: NURSE PRACTITIONER

## 2023-04-03 PROCEDURE — 3044F PR MOST RECENT HEMOGLOBIN A1C LEVEL <7.0%: ICD-10-PCS | Mod: CPTII,S$GLB,, | Performed by: NURSE PRACTITIONER

## 2023-04-03 PROCEDURE — 3008F BODY MASS INDEX DOCD: CPT | Mod: CPTII,S$GLB,, | Performed by: NURSE PRACTITIONER

## 2023-04-03 PROCEDURE — 3078F DIAST BP <80 MM HG: CPT | Mod: CPTII,S$GLB,, | Performed by: NURSE PRACTITIONER

## 2023-04-03 NOTE — PROGRESS NOTES
"[facility]  Department of Surgery      REFERRING PROVIDER: Jazz Leiva MD  [address]  [address]  [place] [address]    Chief Complaint: Breast Discharge (New Patient Left Breast Nipple Discharge .)      Subjective:      Patient ID: Anabel Frost is a 27 y.o. female who presents with left nipple discharge. She first noticed it 2022. She described it as "green liquid". She noticed it was dried on her nipple. She picked at it and then noticed the discharge. She reports she can elicit it with compression. Patient does routinely do self breast exams.  Patient has noted a change on breast exam. Denies palpating a mass or new skin changes.  Patient denies to previous breast biopsy. Patient denies a personal history of breast cancer.    GYN History:  Age of menarche was 12.   Age of menopause was N/A.    Last menstrual period was unsure (IUD in place).   Patient denies hormonal therapy.   Patient is .   Age of first live birth was N/A.     Past Medical History:   Diagnosis Date    Anxiety     Class 1 obesity 2021    Cluster headaches     Depression     Migraines      Past Surgical History:   Procedure Laterality Date    NO PAST SURGERIES       Current Outpatient Medications on File Prior to Visit   Medication Sig Dispense Refill    cyclobenzaprine (FLEXERIL) 10 MG tablet Take 1 tablet (10 mg total) by mouth nightly as needed for Muscle spasms (tension headache). 30 tablet 0    levonorgestreL (KYLEENA) 17.5 mcg/24 hrs (5 yrs) 19.5 mg IUD 1 each by Intrauterine route once.      promethazine (PHENERGAN) 12.5 MG Tab Take 1 tablet (12.5 mg total) by mouth every 6 (six) hours as needed (nausea). 90 tablet 1    sumatriptan (IMITREX) 100 MG tablet TAKE 1 TABLET BY MOUTH AT START OF HEADACHE MAY REPEAT AFTER 2 HOURS 20 tablet 1    venlafaxine (EFFEXOR) 75 MG tablet Take 1 tablet (75 mg total) by mouth 2 (two) times daily. 180 tablet 1     No current facility-administered medications on " "file prior to visit.     Social History     Socioeconomic History    Marital status: Single   Tobacco Use    Smoking status: Never    Smokeless tobacco: Never   Substance and Sexual Activity    Alcohol use: Not Currently     Comment: social    Drug use: Never    Sexual activity: Not Currently     Partners: Female     Birth control/protection: None, I.U.D.     Comment: [place] 12-     Social Determinants of Health     Physical Activity: Unknown    Days of Exercise per Week: 3 days     Family History   Problem Relation Age of Onset    Depression Mother     Anemia Maternal Grandfather     Cancer Paternal Grandfather     Anemia Paternal Aunt     Breast cancer Neg Hx     Colon cancer Neg Hx     Ovarian cancer Neg Hx         Review of Systems   Constitutional:  Negative for chills, fatigue and fever.   Respiratory:  Negative for cough, shortness of breath and wheezing.    Cardiovascular:  Negative for chest pain.   Skin:  Negative for color change, pallor, rash and wound.   Neurological:  Positive for headaches. Negative for dizziness.   Objective:   /69 (BP Location: Right arm, Patient Position: Sitting, BP Method: Medium (Automatic))   Pulse 66   Temp 99 °F (37.2 °C) (Oral)   Ht 5' 5" (1.651 m)   Wt 88 kg (194 lb)   SpO2 95%   BMI 32.28 kg/m²     Physical Exam   Vitals reviewed.  Constitutional: She is oriented to person, place, and time.   Pulmonary/Chest: Effort normal. No respiratory distress. She has no wheezes. Right breast exhibits no inverted nipple, no mass, no nipple discharge, no skin change and no tenderness. Left breast exhibits nipple discharge. Left breast exhibits no inverted nipple, no mass, no skin change and no tenderness.       Abdominal: Normal appearance.   Musculoskeletal: Normal range of motion. Lymphadenopathy:      Cervical: No cervical adenopathy.     Neurological: She is alert and oriented to person, place, and time.   Skin: Skin is warm and dry. No rash noted. No erythema. " No pallor.       Radiology review: Images personally reviewed by me in the clinic.     2/6/2023 US Breast Left Limited     History:  Patient is 27 y.o. and is seen for breast discharge.  She reports the discharge is spontaneous and greenish in color.        Films Compared:  No prior studies were available for comparison.      Findings:  Targeted ultrasound was performed in the right subareolar region and right axilla where the patient reports possible lump.  There is no suspicious mass.  No duct dilatation is seen.  There is no axillary adenopathy.     Impression:  No sonographic evidence of malignancy.     BI-RADS Category 1: Negative Finding     Recommendation:  Surgical Consult is recommended for evaluation of the nipple discharge. I discussed these findings and recommendations with the patient in detail at the time of exam.       Assessment:       1. Nipple discharge    2. Encounter for screening breast examination        Plan:   We discussed the options for management of nipple discharge. 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  We will perform a duct excision to treat and stop the discharge. Recent imaging was negative. We discussed 3 month follow up for nipple discharge   Educated to stop eliciting the discharge   Can consider imaging in 3 - 6 months if symptoms worsen or do not improve   Patient will follow up sooner with any new or worsening symptoms.     The patient is in agreement with the plan. Questions were encouraged and answered to patient's satisfaction. Anabel will call our office with any questions or concerns.

## 2023-04-16 ENCOUNTER — PATIENT MESSAGE (OUTPATIENT)
Dept: PRIMARY CARE CLINIC | Facility: CLINIC | Age: 28
End: 2023-04-16
Payer: COMMERCIAL

## 2023-04-19 ENCOUNTER — PATIENT MESSAGE (OUTPATIENT)
Dept: PRIMARY CARE CLINIC | Facility: CLINIC | Age: 28
End: 2023-04-19
Payer: COMMERCIAL

## 2023-04-21 RX ORDER — AZITHROMYCIN 250 MG/1
TABLET, FILM COATED ORAL
Qty: 6 TABLET | Refills: 0 | Status: SHIPPED | OUTPATIENT
Start: 2023-04-21 | End: 2023-04-26

## 2023-04-28 DIAGNOSIS — G43.009 MIGRAINE WITHOUT AURA AND WITHOUT STATUS MIGRAINOSUS, NOT INTRACTABLE: ICD-10-CM

## 2023-04-28 NOTE — TELEPHONE ENCOUNTER
No care due was identified.  [facility] Embedded Care Due Messages. Reference number: [id number].   4/28/2023 4:35:41 PM CDT

## 2023-05-01 RX ORDER — SUMATRIPTAN SUCCINATE 100 MG/1
TABLET ORAL
Qty: 20 TABLET | Refills: 1 | Status: SHIPPED | OUTPATIENT
Start: 2023-05-01 | End: 2023-11-11 | Stop reason: SDUPTHER

## 2023-05-30 NOTE — TELEPHONE ENCOUNTER
No care due was identified.  [facility] Embedded Care Due Messages. Reference number: [id number].   5/30/2023 9:58:22 AM CDT

## 2023-05-31 RX ORDER — VENLAFAXINE 75 MG/1
75 TABLET ORAL 2 TIMES DAILY
Qty: 180 TABLET | Refills: 1 | Status: SHIPPED | OUTPATIENT
Start: 2023-05-31 | End: 2023-12-04 | Stop reason: SDUPTHER

## 2023-07-12 ENCOUNTER — OFFICE VISIT (OUTPATIENT)
Dept: SURGERY | Facility: CLINIC | Age: 28
End: 2023-07-12
Payer: COMMERCIAL

## 2023-07-12 VITALS
HEART RATE: 99 BPM | WEIGHT: 189.63 LBS | HEIGHT: 65 IN | BODY MASS INDEX: 31.59 KG/M2 | SYSTOLIC BLOOD PRESSURE: 136 MMHG | DIASTOLIC BLOOD PRESSURE: 73 MMHG

## 2023-07-12 DIAGNOSIS — N64.52 NIPPLE DISCHARGE: Primary | ICD-10-CM

## 2023-07-12 DIAGNOSIS — Z09 FOLLOW-UP EXAM: ICD-10-CM

## 2023-07-12 PROCEDURE — 1159F PR MEDICATION LIST DOCUMENTED IN MEDICAL RECORD: ICD-10-PCS | Mod: CPTII,S$GLB,, | Performed by: NURSE PRACTITIONER

## 2023-07-12 PROCEDURE — 3008F BODY MASS INDEX DOCD: CPT | Mod: CPTII,S$GLB,, | Performed by: NURSE PRACTITIONER

## 2023-07-12 PROCEDURE — 3075F SYST BP GE 130 - 139MM HG: CPT | Mod: CPTII,S$GLB,, | Performed by: NURSE PRACTITIONER

## 2023-07-12 PROCEDURE — 99213 OFFICE O/P EST LOW 20 MIN: CPT | Mod: S$GLB,,, | Performed by: NURSE PRACTITIONER

## 2023-07-12 PROCEDURE — 99999 PR PBB SHADOW E&M-EST. PATIENT-LVL III: ICD-10-PCS | Mod: PBBFAC,,, | Performed by: NURSE PRACTITIONER

## 2023-07-12 PROCEDURE — 99999 PR PBB SHADOW E&M-EST. PATIENT-LVL III: CPT | Mod: PBBFAC,,, | Performed by: NURSE PRACTITIONER

## 2023-07-12 PROCEDURE — 3044F HG A1C LEVEL LT 7.0%: CPT | Mod: CPTII,S$GLB,, | Performed by: NURSE PRACTITIONER

## 2023-07-12 PROCEDURE — 3044F PR MOST RECENT HEMOGLOBIN A1C LEVEL <7.0%: ICD-10-PCS | Mod: CPTII,S$GLB,, | Performed by: NURSE PRACTITIONER

## 2023-07-12 PROCEDURE — 3008F PR BODY MASS INDEX (BMI) DOCUMENTED: ICD-10-PCS | Mod: CPTII,S$GLB,, | Performed by: NURSE PRACTITIONER

## 2023-07-12 PROCEDURE — 3075F PR MOST RECENT SYSTOLIC BLOOD PRESS GE 130-139MM HG: ICD-10-PCS | Mod: CPTII,S$GLB,, | Performed by: NURSE PRACTITIONER

## 2023-07-12 PROCEDURE — 1159F MED LIST DOCD IN RCRD: CPT | Mod: CPTII,S$GLB,, | Performed by: NURSE PRACTITIONER

## 2023-07-12 PROCEDURE — 99213 PR OFFICE/OUTPT VISIT, EST, LEVL III, 20-29 MIN: ICD-10-PCS | Mod: S$GLB,,, | Performed by: NURSE PRACTITIONER

## 2023-07-12 PROCEDURE — 3078F PR MOST RECENT DIASTOLIC BLOOD PRESSURE < 80 MM HG: ICD-10-PCS | Mod: CPTII,S$GLB,, | Performed by: NURSE PRACTITIONER

## 2023-07-12 PROCEDURE — 3078F DIAST BP <80 MM HG: CPT | Mod: CPTII,S$GLB,, | Performed by: NURSE PRACTITIONER

## 2023-07-12 NOTE — PROGRESS NOTES
"[facility]  Department of Surgery      REFERRING PROVIDER: No referring provider defined for this encounter.    Chief Complaint: Follow-up      Subjective:      Patient ID: Anabel Frost is a 27 y.o. female who presents with left nipple discharge. She first noticed it 2022. She described it as "green liquid". She noticed it was dried on her nipple. She picked at it and then noticed the discharge. She reports she can elicit it with compression. Patient does routinely do self breast exams.  Patient has noted a change on breast exam. Denies palpating a mass or new skin changes.  Patient denies to previous breast biopsy. Patient denies a personal history of breast cancer.    GYN History:  Age of menarche was 12.   Age of menopause was N/A.    Last menstrual period was unsure (IUD in place).   Patient denies hormonal therapy.   Patient is .   Age of first live birth was N/A.     Interval History:   Patient presents for 3 month follow up. She reports 2 episodes of spontaneous nipple discharge of the left breast. She has continued to leave the breast alone and not elicit any discharge. Denies palpating breast mass or skin changes.         Past Medical History:   Diagnosis Date    Anxiety     Class 1 obesity 2021    Cluster headaches     Depression     Migraines      Past Surgical History:   Procedure Laterality Date    NO PAST SURGERIES       Current Outpatient Medications on File Prior to Visit   Medication Sig Dispense Refill    cyclobenzaprine (FLEXERIL) 10 MG tablet Take 1 tablet (10 mg total) by mouth nightly as needed for Muscle spasms (tension headache). 30 tablet 0    levonorgestreL (KYLEENA) 17.5 mcg/24 hrs (5 yrs) 19.5 mg IUD 1 each by Intrauterine route once.      promethazine (PHENERGAN) 12.5 MG Tab Take 1 tablet (12.5 mg total) by mouth every 6 (six) hours as needed (nausea). 90 tablet 1    sumatriptan (IMITREX) 100 MG tablet TAKE 1 TABLET BY MOUTH AT START OF HEADACHE MAY REPEAT " "AFTER 2 HOURS 20 tablet 1    venlafaxine (EFFEXOR) 75 MG tablet Take 1 tablet (75 mg total) by mouth 2 (two) times daily. 180 tablet 1     No current facility-administered medications on file prior to visit.     Social History     Socioeconomic History    Marital status: Single   Tobacco Use    Smoking status: Never    Smokeless tobacco: Never   Substance and Sexual Activity    Alcohol use: Not Currently     Comment: social    Drug use: Never    Sexual activity: Not Currently     Partners: Female     Birth control/protection: None, I.U.D.     Comment: [facility] 12-     Social Determinants of Health     Physical Activity: Unknown    Days of Exercise per Week: 3 days     Family History   Problem Relation Age of Onset    Depression Mother     Anemia Maternal Grandfather     Cancer Paternal Grandfather     Anemia Paternal Aunt     Breast cancer Neg Hx     Colon cancer Neg Hx     Ovarian cancer Neg Hx         Review of Systems   Constitutional:  Negative for chills, fatigue and fever.   Respiratory:  Negative for cough, shortness of breath and wheezing.    Cardiovascular:  Negative for chest pain.   Skin:  Negative for color change, pallor, rash and wound.   Neurological:  Positive for headaches. Negative for dizziness.   Objective:   /73 (BP Location: Right arm, Patient Position: Sitting, BP Method: Medium (Automatic))   Pulse 99   Ht 5' 5" (1.651 m)   Wt 86 kg (189 lb 9.5 oz)   BMI 31.55 kg/m²     Physical Exam   Vitals reviewed.  Constitutional: She is oriented to person, place, and time.   Pulmonary/Chest: Effort normal. No respiratory distress. She has no wheezes. Right breast exhibits no inverted nipple, no mass, no nipple discharge, no skin change and no tenderness. Left breast exhibits nipple discharge. Left breast exhibits no inverted nipple, no mass, no skin change and no tenderness.       Abdominal: Normal appearance.   Musculoskeletal: Normal range of motion. Lymphadenopathy:      Cervical: No " cervical adenopathy.     Neurological: She is alert and oriented to person, place, and time.   Skin: Skin is warm and dry. No rash noted. No erythema. No pallor.       Radiology review: Images personally reviewed by me in the clinic.     2/6/2023 US Breast Left Limited     History:  Patient is 27 y.o. and is seen for breast discharge.  She reports the discharge is spontaneous and greenish in color.        Films Compared:  No prior studies were available for comparison.      Findings:  Targeted ultrasound was performed in the right subareolar region and right axilla where the patient reports possible lump.  There is no suspicious mass.  No duct dilatation is seen.  There is no axillary adenopathy.     Impression:  No sonographic evidence of malignancy.     BI-RADS Category 1: Negative Finding     Recommendation:  Surgical Consult is recommended for evaluation of the nipple discharge. I discussed these findings and recommendations with the patient in detail at the time of exam.       Assessment:       1. Nipple discharge    2. Follow-up exam          Plan:   We discussed the options for management of nipple discharge. 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  We will perform a duct excision to treat and stop the discharge if it continues to become bothersome. Recent imaging was negative.  Educated to stop eliciting the discharge   Will proceed with repeat imaging for continued discharge. Will obtain mammogram as well   Patient will follow up with breast surgeon to discuss surgical options   Patient will follow up sooner with any new or worsening symptoms.     The patient is in agreement with the plan. Questions were encouraged and answered to patient's satisfaction. Anabel will call our office with any questions or concerns.

## 2023-07-24 ENCOUNTER — PATIENT MESSAGE (OUTPATIENT)
Dept: PRIMARY CARE CLINIC | Facility: CLINIC | Age: 28
End: 2023-07-24
Payer: COMMERCIAL

## 2023-07-26 ENCOUNTER — PATIENT MESSAGE (OUTPATIENT)
Dept: SURGERY | Facility: CLINIC | Age: 28
End: 2023-07-26
Payer: COMMERCIAL

## 2023-08-16 ENCOUNTER — HOSPITAL ENCOUNTER (OUTPATIENT)
Dept: RADIOLOGY | Facility: HOSPITAL | Age: 28
Discharge: HOME | End: 2023-08-16
Attending: NURSE PRACTITIONER
Payer: COMMERCIAL

## 2023-08-16 DIAGNOSIS — N64.52 NIPPLE DISCHARGE: ICD-10-CM

## 2023-08-16 PROCEDURE — 76642 ULTRASOUND BREAST LIMITED: CPT | Mod: 26,LT,, | Performed by: RADIOLOGY

## 2023-08-16 PROCEDURE — 76642 US BREAST LEFT LIMITED: ICD-10-PCS | Mod: 26,LT,, | Performed by: RADIOLOGY

## 2023-08-16 PROCEDURE — 76642 ULTRASOUND BREAST LIMITED: CPT | Mod: TC,LT

## 2023-11-11 DIAGNOSIS — G43.009 MIGRAINE WITHOUT AURA AND WITHOUT STATUS MIGRAINOSUS, NOT INTRACTABLE: ICD-10-CM

## 2023-11-11 NOTE — TELEPHONE ENCOUNTER
No care due was identified.  [facility] Embedded Care Due Messages. Reference number: [id number].   11/11/2023 5:43:46 PM CST

## 2023-11-13 RX ORDER — SUMATRIPTAN SUCCINATE 100 MG/1
TABLET ORAL
Qty: 20 TABLET | Refills: 0 | Status: SHIPPED | OUTPATIENT
Start: 2023-11-13 | End: 2024-01-15 | Stop reason: SDUPTHER

## 2023-11-21 ENCOUNTER — PATIENT MESSAGE (OUTPATIENT)
Dept: OBSTETRICS AND GYNECOLOGY | Facility: CLINIC | Age: 28
End: 2023-11-21
Payer: COMMERCIAL

## 2023-12-05 NOTE — TELEPHONE ENCOUNTER
Care Due:                  Date            Visit Type   Department     Provider  --------------------------------------------------------------------------------                                EP -                              PRIMARY      [facility]  Last Visit: 03-      CARE ([facility])   CAROLINE Juares  Next Visit: None Scheduled  None         None Found                                                            Last  Test          Frequency    Reason                     Performed    Due Date  --------------------------------------------------------------------------------    Cr..........  12 months..  venlafaxine..............  02-   02-    [facility] Embedded Care Due Messages. Reference number: [id number].   12/04/2023 11:53:56 PM CST

## 2023-12-06 ENCOUNTER — PATIENT MESSAGE (OUTPATIENT)
Dept: PRIMARY CARE CLINIC | Facility: CLINIC | Age: 28
End: 2023-12-06
Payer: COMMERCIAL

## 2023-12-06 RX ORDER — VENLAFAXINE 75 MG/1
75 TABLET ORAL 2 TIMES DAILY
Qty: 180 TABLET | Refills: 1 | Status: SHIPPED | OUTPATIENT
Start: 2023-12-06 | End: 2024-03-28

## 2023-12-07 RX ORDER — VENLAFAXINE 75 MG/1
75 TABLET ORAL 2 TIMES DAILY
Qty: 180 TABLET | Refills: 1 | Status: CANCELLED | OUTPATIENT
Start: 2023-12-07 | End: 2024-12-06

## 2023-12-18 ENCOUNTER — PATIENT MESSAGE (OUTPATIENT)
Dept: OBSTETRICS AND GYNECOLOGY | Facility: CLINIC | Age: 28
End: 2023-12-18
Payer: COMMERCIAL

## 2024-01-04 ENCOUNTER — HOSPITAL ENCOUNTER (OUTPATIENT)
Dept: RADIOLOGY | Facility: OTHER | Age: 29
Discharge: HOME | End: 2024-01-04
Attending: OBSTETRICS & GYNECOLOGY
Payer: COMMERCIAL

## 2024-01-04 ENCOUNTER — OFFICE VISIT (OUTPATIENT)
Dept: OBSTETRICS AND GYNECOLOGY | Facility: CLINIC | Age: 29
End: 2024-01-04
Payer: COMMERCIAL

## 2024-01-04 VITALS
WEIGHT: 190.5 LBS | DIASTOLIC BLOOD PRESSURE: 86 MMHG | SYSTOLIC BLOOD PRESSURE: 118 MMHG | BODY MASS INDEX: 31.74 KG/M2 | HEIGHT: 65 IN

## 2024-01-04 DIAGNOSIS — Z97.5 BREAKTHROUGH BLEEDING ASSOCIATED WITH INTRAUTERINE DEVICE (IUD): Primary | ICD-10-CM

## 2024-01-04 DIAGNOSIS — Z97.5 BREAKTHROUGH BLEEDING ASSOCIATED WITH INTRAUTERINE DEVICE (IUD): ICD-10-CM

## 2024-01-04 DIAGNOSIS — N92.1 BREAKTHROUGH BLEEDING ASSOCIATED WITH INTRAUTERINE DEVICE (IUD): ICD-10-CM

## 2024-01-04 DIAGNOSIS — N92.1 BREAKTHROUGH BLEEDING ASSOCIATED WITH INTRAUTERINE DEVICE (IUD): Primary | ICD-10-CM

## 2024-01-04 LAB
B-HCG UR QL: NEGATIVE
CTP QC/QA: YES

## 2024-01-04 PROCEDURE — 3074F SYST BP LT 130 MM HG: CPT | Mod: CPTII,S$GLB,, | Performed by: OBSTETRICS & GYNECOLOGY

## 2024-01-04 PROCEDURE — 3008F BODY MASS INDEX DOCD: CPT | Mod: CPTII,S$GLB,, | Performed by: OBSTETRICS & GYNECOLOGY

## 2024-01-04 PROCEDURE — 87491 CHLMYD TRACH DNA AMP PROBE: CPT | Performed by: OBSTETRICS & GYNECOLOGY

## 2024-01-04 PROCEDURE — 76856 US EXAM PELVIC COMPLETE: CPT | Mod: 26,,, | Performed by: RADIOLOGY

## 2024-01-04 PROCEDURE — 99999 PR PBB SHADOW E&M-EST. PATIENT-LVL III: CPT | Mod: PBBFAC,,, | Performed by: OBSTETRICS & GYNECOLOGY

## 2024-01-04 PROCEDURE — 76830 TRANSVAGINAL US NON-OB: CPT | Mod: 26,,, | Performed by: RADIOLOGY

## 2024-01-04 PROCEDURE — 81025 URINE PREGNANCY TEST: CPT | Mod: S$GLB,,, | Performed by: OBSTETRICS & GYNECOLOGY

## 2024-01-04 PROCEDURE — 76830 TRANSVAGINAL US NON-OB: CPT | Mod: TC

## 2024-01-04 PROCEDURE — 99213 OFFICE O/P EST LOW 20 MIN: CPT | Mod: S$GLB,,, | Performed by: OBSTETRICS & GYNECOLOGY

## 2024-01-04 PROCEDURE — 3079F DIAST BP 80-89 MM HG: CPT | Mod: CPTII,S$GLB,, | Performed by: OBSTETRICS & GYNECOLOGY

## 2024-01-04 PROCEDURE — 1159F MED LIST DOCD IN RCRD: CPT | Mod: CPTII,S$GLB,, | Performed by: OBSTETRICS & GYNECOLOGY

## 2024-01-04 NOTE — PROGRESS NOTES
Subjective:       Patient ID: Anabel Frost is a 28 y.o. female.    Chief Complaint:  Vaginitis      History of Present Illness  27 yo G0 presents with complaints of recurrent breakthrough bleeding and prolonged menses with Kyleena IUD in place.  IUD placed 2020.  Most recently experienced 1 month of bleeding with 2 weeks of heavy flow.  Denies lightheadedness or dizziness. Agrees to STD screening today.    Patient Active Problem List   Diagnosis    Migraine without aura and without status migrainosus, not intractable    Anxiety and depression    Fatigue    Obstructive sleep apnea    Dietary iron deficiency    Family history of hereditary hemorrhagic telangiectasia    Class 1 obesity    MDD (major depressive disorder), recurrent episode, moderate    Tension headache    Iron deficiency    Otalgia       Past Medical History:   Diagnosis Date    Anxiety     Class 1 obesity 2021    Cluster headaches     Depression     Migraines        Past Surgical History:   Procedure Laterality Date    NO PAST SURGERIES         OB History    Para Term  AB Living   0 0 0 0 0 0   SAB IAB Ectopic Multiple Live Births   0 0 0 0 0       Patient's last menstrual period was 2023.   Date of Last Pap: 2023    Review of Systems  Review of Systems   Constitutional:  Negative for activity change, appetite change and fatigue.   Respiratory:  Negative for shortness of breath.    Cardiovascular:  Negative for chest pain.   Gastrointestinal:  Negative for abdominal pain, constipation and diarrhea.   Endocrine: Negative for cold intolerance and heat intolerance.   Genitourinary:  Positive for menstrual irregularity and vaginal bleeding. Negative for dysuria, frequency, pelvic pain and vaginal discharge.   Integumentary:  Negative for breast mass, breast discharge and breast tenderness.   Psychiatric/Behavioral:  Negative for dysphoric mood. The patient is not nervous/anxious.    Breast: Negative for mass and  "tenderness       Objective:   /86   Ht 5' 5" (1.651 m)   Wt 86.4 kg (190 lb 7.6 oz)   LMP 12/06/2023 Comment: Kyleena  BMI 31.70 kg/m²   Body mass index is 31.7 kg/m².    APPEARANCE: Well nourished, well developed, in no acute distress.  PSYCH: Appropriate mood and affect.  SKIN: No acne or hirsutism  NECK: Neck symmetric without masses or thyromegaly  NODES: No inguinal, axillary, or supraclavicular lymph node enlargement  ABDOMEN: Soft.  No tenderness or masses.    CARDIOVASCULAR: No edema of peripheral extremities  PELVIC: Normal external genitalia without lesions.  Normal hair distribution.  Adequate perineal body, normal urethral meatus.  Vagina moist and well rugated without lesions or discharge.  Cervix pink, without lesions, discharge or tenderness.  IUD strings at os. No significant cystocele or rectocele.  Bimanual exam shows uterus to be normal size, regular, mobile and nontender.  Adnexa without masses or tenderness.         Results for orders placed or performed in visit on 03/01/23   PROLACTIN   Result Value Ref Range    Prolactin 11.9 5.2 - 26.5 ng/mL   Hepatic function panel   Result Value Ref Range    Total Protein 7.7 6.0 - 8.4 g/dL    Albumin 4.1 3.5 - 5.2 g/dL    Total Bilirubin 0.3 0.1 - 1.0 mg/dL    Bilirubin, Direct 0.1 0.1 - 0.3 mg/dL    AST 26 10 - 40 U/L    ALT 30 10 - 44 U/L    Alkaline Phosphatase 82 55 - 135 U/L   FERRITIN   Result Value Ref Range    Ferritin 271 20.0 - 300.0 ng/mL       Assessment/ Plan:     1. Breakthrough bleeding associated with intrauterine device (IUD)  POCT urine pregnancy    CBC Auto Differential    TSH    US Pelvis Comp with Transvag NON-OB (xpd    C. trachomatis/N. gonorrhoeae by AMP DNA [facility]; Cervicovaginal        Workup as above.   RTC for VV in 1 month to review results and alternative contraceptive options.         Amanda N. Thomas, MD [facility]  01/04/2024  "

## 2024-01-05 LAB
C TRACH DNA SPEC QL NAA+PROBE: NOT DETECTED
N GONORRHOEA DNA SPEC QL NAA+PROBE: NOT DETECTED

## 2024-01-15 DIAGNOSIS — G43.009 MIGRAINE WITHOUT AURA AND WITHOUT STATUS MIGRAINOSUS, NOT INTRACTABLE: ICD-10-CM

## 2024-01-15 DIAGNOSIS — G44.209 TENSION HEADACHE: ICD-10-CM

## 2024-01-15 NOTE — TELEPHONE ENCOUNTER
No care due was identified.  [facility] Embedded Care Due Messages. Reference number: [id number].   1/15/2024 5:03:03 PM CST

## 2024-01-15 NOTE — TELEPHONE ENCOUNTER
No care due was identified.  Health [facility] Embedded Care Due Messages. Reference number: [id number].   1/15/2024 5:02:12 PM CST

## 2024-01-15 NOTE — TELEPHONE ENCOUNTER
No care due was identified.  Health [facility] Embedded Care Due Messages. Reference number: [id number].   1/15/2024 5:03:21 PM CST

## 2024-01-16 RX ORDER — SUMATRIPTAN SUCCINATE 100 MG/1
TABLET ORAL
Qty: 20 TABLET | Refills: 0 | Status: SHIPPED | OUTPATIENT
Start: 2024-01-16 | End: 2024-03-01 | Stop reason: SDUPTHER

## 2024-01-16 RX ORDER — CYCLOBENZAPRINE HCL 10 MG
10 TABLET ORAL NIGHTLY PRN
Qty: 30 TABLET | Refills: 0 | Status: SHIPPED | OUTPATIENT
Start: 2024-01-16 | End: 2024-02-19

## 2024-01-17 RX ORDER — SUMATRIPTAN SUCCINATE 100 MG/1
TABLET ORAL
Qty: 9 TABLET | Refills: 2 | OUTPATIENT
Start: 2024-01-17

## 2024-01-17 NOTE — TELEPHONE ENCOUNTER
Refill Decision Note   Anabel Frost  is requesting a refill authorization.  Brief Assessment and Rationale for Refill:  Quick Discontinue     Medication Therapy Plan:  Receipt confirmed by pharmacy (1/16/2024 12:20 PM CST)      Comments:     Note composed:3:36 AM 01/17/2024

## 2024-01-25 ENCOUNTER — PATIENT MESSAGE (OUTPATIENT)
Dept: OBSTETRICS AND GYNECOLOGY | Facility: CLINIC | Age: 29
End: 2024-01-25
Payer: COMMERCIAL

## 2024-01-25 ENCOUNTER — TELEPHONE (OUTPATIENT)
Dept: OBSTETRICS AND GYNECOLOGY | Facility: CLINIC | Age: 29
End: 2024-01-25
Payer: COMMERCIAL

## 2024-02-18 DIAGNOSIS — G44.209 TENSION HEADACHE: ICD-10-CM

## 2024-02-18 NOTE — TELEPHONE ENCOUNTER
Care Due:                  Date            Visit Type   Department     Provider  --------------------------------------------------------------------------------                                EP -                              PRIMARY      [facility] PRIMARY  Last Visit: 03-      CARE ([facility])   CAROLINE Juares                              EP -                              PRIMARY      [facility] PRIMARY  Next Visit: 03-      CARE ([facility])   CAROLINE Juares                                                            Last  Test          Frequency    Reason                     Performed    Due Date  --------------------------------------------------------------------------------    Cr..........  12 months..  venlafaxine..............  02-   02-    Health [facility] Embedded Care Due Messages. Reference number: [id number].   2/18/2024 4:26:48 PM CST

## 2024-02-19 RX ORDER — CYCLOBENZAPRINE HCL 10 MG
10 TABLET ORAL NIGHTLY PRN
Qty: 30 TABLET | Refills: 0 | Status: SHIPPED | OUTPATIENT
Start: 2024-02-19 | End: 2024-04-19

## 2024-03-01 DIAGNOSIS — G43.009 MIGRAINE WITHOUT AURA AND WITHOUT STATUS MIGRAINOSUS, NOT INTRACTABLE: ICD-10-CM

## 2024-03-01 RX ORDER — SUMATRIPTAN SUCCINATE 100 MG/1
TABLET ORAL
Qty: 20 TABLET | Refills: 0 | Status: SHIPPED | OUTPATIENT
Start: 2024-03-01 | End: 2024-04-18 | Stop reason: SDUPTHER

## 2024-03-01 NOTE — TELEPHONE ENCOUNTER
No care due was identified.  Health [facility] Embedded Care Due Messages. Reference number: [id number].   3/01/2024 11:34:43 AM CST

## 2024-03-07 ENCOUNTER — PATIENT MESSAGE (OUTPATIENT)
Dept: PRIMARY CARE CLINIC | Facility: CLINIC | Age: 29
End: 2024-03-07
Payer: COMMERCIAL

## 2024-03-07 DIAGNOSIS — Z00.00 WELLNESS EXAMINATION: Primary | ICD-10-CM

## 2024-03-12 ENCOUNTER — E-VISIT (OUTPATIENT)
Dept: FAMILY MEDICINE | Facility: CLINIC | Age: 29
End: 2024-03-12
Payer: COMMERCIAL

## 2024-03-12 DIAGNOSIS — R51.9 SINUS HEADACHE: ICD-10-CM

## 2024-03-12 DIAGNOSIS — J01.00 SUBACUTE MAXILLARY SINUSITIS: Primary | ICD-10-CM

## 2024-03-12 PROCEDURE — 99422 OL DIG E/M SVC 11-20 MIN: CPT | Mod: ,,, | Performed by: STUDENT IN AN ORGANIZED HEALTH CARE EDUCATION/TRAINING PROGRAM

## 2024-03-12 RX ORDER — NAPROXEN 500 MG/1
500 TABLET ORAL 2 TIMES DAILY PRN
Qty: 14 TABLET | Refills: 0 | Status: SHIPPED | OUTPATIENT
Start: 2024-03-12 | End: 2024-03-19

## 2024-03-12 RX ORDER — DOXYCYCLINE HYCLATE 100 MG
100 TABLET ORAL 2 TIMES DAILY
Qty: 14 EACH | Refills: 0 | Status: SHIPPED | OUTPATIENT
Start: 2024-03-12 | End: 2024-03-19

## 2024-03-12 RX ORDER — DESLORATADINE 5 MG/1
5 TABLET ORAL DAILY
Qty: 7 TABLET | Refills: 0 | Status: SHIPPED | OUTPATIENT
Start: 2024-03-12 | End: 2024-03-28

## 2024-03-12 NOTE — PROGRESS NOTES
Patient ID: Anabel Frost is a 28 y.o. female.    Chief Complaint: URI (Entered automatically based on patient selection in Patient Portal.)          274}  The patient initiated a request through [employer] on 3/12/2024 for evaluation and management with a chief complaint of URI (Entered automatically based on patient selection in Patient Portal.)     I evaluated the questionnaire submission on 03/12/2024 .    Total Time (in minutes): 12     [facility] Peq Evisit Upper Respitatory/Cough Questionnaire    3/12/2024  8:28 AM CDT - Filed by Patient   Do you agree to participate in an E-Visit? Yes   If you have any of the following symptoms, please present to your local ER or call 911:  I acknowledge   What is the main issue that you would like for your doctor to address today? Possible sinus infection   Are you able to take your vital signs? Yes   Systolic Blood Pressure:    Diastolic Blood Pressure:    Weight: 186   Height: 65   Pulse: 103   Temperature: 99.9   Respiration rate: 23   Pulse Oxygen:    Are you currently pregnant, could you be pregnant, or are you breast feeding? None of the above   What symptoms do you currently have?  Chills;  Fatigue;  Headache;  Muscle or body aches;  Pain around the nose and face;  Neck pain   Have you ever smoked? I have never smoked   Have you had a fever? Yes   What has been the range of your fever? Less than 100.4   When did your symptoms first appear? 3/8/2024   In the last two weeks, have you been in close contact with someone who has COVID-19 or the Flu? No   In the last two weeks, have you worked or volunteered in a healthcare facility or as a ? Healthcare facilities include a hospital, medical or dental clinic, long-term care facility, or nursing home No   Do you live in a long-term care facility, nursing home, group home, or homeless shelter? No   List what you have done or taken to help your symptoms. Last 2 weekends, sinus pressure and moderate pain and took  sudafed. Fever started during sleep sunday night. Been drinking fluids and taking excedrin migraine   How severe are your symptoms? Moderate   Have your symptoms improved since they first appeared? No change   Have you taken an at home Covid test? No   Have you taken a Flu test? No   Have you been fully vaccinated for COVID? (2 Pfizer, 2 Moderna or 1 Augie & Augie vaccine injections) Yes   Have you received a booster? Yes   Have you recieved a Flu shot? No   Do you have transportation to get tested for COVID if it is indicated and ordered for you at an [facility] location? Yes   Provide any information you feel is important to your history not asked above    Please attach any relevant images or files           Active Problem List with Overview Notes    Diagnosis Date Noted    Otalgia 09/01/2022    Iron deficiency 08/25/2022    Tension headache 02/06/2022    Class 1 obesity 07/08/2021    MDD (major depressive disorder), recurrent episode, moderate 07/08/2021    Dietary iron deficiency 10/23/2020    Family history of hereditary hemorrhagic telangiectasia 10/23/2020     Maternal GF and maternal aunt      Fatigue 10/15/2020    Obstructive sleep apnea 10/15/2020    Migraine without aura and without status migrainosus, not intractable 07/16/2020     Usually monthly, photophobia and nausea   imitrex is abortive       Anxiety and depression 07/16/2020     Has been on ssri for 3 years, recently increased to 20 mg, better controlled         Recent Labs Obtained:  Lab Results   Component Value Date    WBC 9.72 01/04/2024    HGB 14.5 01/04/2024    HCT 44.8 01/04/2024    MCV 90 01/04/2024     01/04/2024     02/20/2023    K 4.3 02/20/2023    GLU 97 02/20/2023    CREATININE 0.8 02/20/2023    EGFRNORACEVR >60 02/20/2023    HGBA1C 5.0 02/20/2023      Review of patient's allergies indicates:   Allergen Reactions    Amoxicillin Other (See Comments)    Keflex [cephalexin] Other (See Comments)     crying    Pertussis  vaccines Other (See Comments)    Sulfa (sulfonamide antibiotics) Other (See Comments)       Encounter Diagnoses   Name Primary?    Subacute maxillary sinusitis Yes    Sinus headache         No orders of the defined types were placed in this encounter.     Medications Ordered This Encounter   Medications    desloratadine (CLARINEX) 5 mg tablet     Sig: Take 1 tablet (5 mg total) by mouth once daily. for 7 days     Dispense:  7 tablet     Refill:  0    doxycycline (VIBRA-TABS) 100 MG tablet     Sig: Take 1 tablet (100 mg total) by mouth 2 (two) times daily. for 7 days     Dispense:  14 each     Refill:  0    naproxen (NAPROSYN) 500 MG tablet     Sig: Take 1 tablet (500 mg total) by mouth 2 (two) times daily as needed (pain).     Dispense:  14 tablet     Refill:  0        E-Visit Time Tracking:    Day 1 Time (in minutes): 12    Total Time (in minutes): 12      274}

## 2024-03-14 ENCOUNTER — TELEPHONE (OUTPATIENT)
Dept: PRIMARY CARE CLINIC | Facility: CLINIC | Age: 29
End: 2024-03-14
Payer: COMMERCIAL

## 2024-03-15 NOTE — TELEPHONE ENCOUNTER
She just had TSH and CBC with gyn. She needs CMP and lipids  which are ordered   Romina Juares MD  3/15/2024 5:54 AM

## 2024-03-18 ENCOUNTER — LAB VISIT (OUTPATIENT)
Dept: LAB | Facility: OTHER | Age: 29
End: 2024-03-18
Attending: INTERNAL MEDICINE
Payer: COMMERCIAL

## 2024-03-18 DIAGNOSIS — Z00.00 WELLNESS EXAMINATION: ICD-10-CM

## 2024-03-18 LAB
ALBUMIN SERPL BCP-MCNC: 4.1 G/DL (ref 3.5–5.2)
ALP SERPL-CCNC: 290 U/L (ref 55–135)
ALT SERPL W/O P-5'-P-CCNC: 381 U/L (ref 10–44)
ANION GAP SERPL CALC-SCNC: 11 MMOL/L (ref 8–16)
AST SERPL-CCNC: 209 U/L (ref 10–40)
BILIRUB SERPL-MCNC: 0.5 MG/DL (ref 0.1–1)
BUN SERPL-MCNC: 17 MG/DL (ref 6–20)
CALCIUM SERPL-MCNC: 9 MG/DL (ref 8.7–10.5)
CHLORIDE SERPL-SCNC: 105 MMOL/L (ref 95–110)
CHOLEST SERPL-MCNC: 201 MG/DL (ref 120–199)
CHOLEST/HDLC SERPL: 7.4 {RATIO} (ref 2–5)
CO2 SERPL-SCNC: 22 MMOL/L (ref 23–29)
CREAT SERPL-MCNC: 1 MG/DL (ref 0.5–1.4)
EST. GFR  (NO RACE VARIABLE): >60 ML/MIN/1.73 M^2
GLUCOSE SERPL-MCNC: 91 MG/DL (ref 70–110)
HDLC SERPL-MCNC: 27 MG/DL (ref 40–75)
HDLC SERPL: 13.4 % (ref 20–50)
LDLC SERPL CALC-MCNC: ABNORMAL MG/DL (ref 63–159)
NONHDLC SERPL-MCNC: 174 MG/DL
POTASSIUM SERPL-SCNC: 4.5 MMOL/L (ref 3.5–5.1)
PROT SERPL-MCNC: 8.7 G/DL (ref 6–8.4)
SODIUM SERPL-SCNC: 138 MMOL/L (ref 136–145)
TRIGL SERPL-MCNC: 598 MG/DL (ref 30–150)

## 2024-03-18 PROCEDURE — 80053 COMPREHEN METABOLIC PANEL: CPT | Performed by: INTERNAL MEDICINE

## 2024-03-18 PROCEDURE — 36415 COLL VENOUS BLD VENIPUNCTURE: CPT | Performed by: INTERNAL MEDICINE

## 2024-03-18 PROCEDURE — 80061 LIPID PANEL: CPT | Performed by: INTERNAL MEDICINE

## 2024-03-27 ENCOUNTER — PATIENT MESSAGE (OUTPATIENT)
Dept: PRIMARY CARE CLINIC | Facility: CLINIC | Age: 29
End: 2024-03-27
Payer: COMMERCIAL

## 2024-03-27 ENCOUNTER — LAB VISIT (OUTPATIENT)
Dept: LAB | Facility: OTHER | Age: 29
End: 2024-03-27
Attending: INTERNAL MEDICINE
Payer: COMMERCIAL

## 2024-03-27 DIAGNOSIS — K76.9 LIVER CELL INJURY: Primary | ICD-10-CM

## 2024-03-27 DIAGNOSIS — K76.9 LIVER CELL INJURY: ICD-10-CM

## 2024-03-27 LAB
ALBUMIN SERPL BCP-MCNC: 4.4 G/DL (ref 3.5–5.2)
ALP SERPL-CCNC: 140 U/L (ref 55–135)
ALT SERPL W/O P-5'-P-CCNC: 49 U/L (ref 10–44)
AST SERPL-CCNC: 29 U/L (ref 10–40)
BILIRUB DIRECT SERPL-MCNC: 0.2 MG/DL (ref 0.1–0.3)
BILIRUB SERPL-MCNC: 0.6 MG/DL (ref 0.1–1)
PROT SERPL-MCNC: 8 G/DL (ref 6–8.4)

## 2024-03-27 PROCEDURE — 36415 COLL VENOUS BLD VENIPUNCTURE: CPT | Performed by: INTERNAL MEDICINE

## 2024-03-27 PROCEDURE — 80076 HEPATIC FUNCTION PANEL: CPT | Performed by: INTERNAL MEDICINE

## 2024-03-28 ENCOUNTER — OFFICE VISIT (OUTPATIENT)
Dept: PRIMARY CARE CLINIC | Facility: CLINIC | Age: 29
End: 2024-03-28
Payer: COMMERCIAL

## 2024-03-28 VITALS
WEIGHT: 186.31 LBS | SYSTOLIC BLOOD PRESSURE: 128 MMHG | HEIGHT: 65 IN | OXYGEN SATURATION: 98 % | HEART RATE: 104 BPM | DIASTOLIC BLOOD PRESSURE: 78 MMHG | BODY MASS INDEX: 31.04 KG/M2

## 2024-03-28 DIAGNOSIS — F33.1 MDD (MAJOR DEPRESSIVE DISORDER), RECURRENT EPISODE, MODERATE: ICD-10-CM

## 2024-03-28 DIAGNOSIS — F32.A ANXIETY AND DEPRESSION: ICD-10-CM

## 2024-03-28 DIAGNOSIS — R74.01 TRANSAMINITIS: ICD-10-CM

## 2024-03-28 DIAGNOSIS — R41.840 INATTENTION: ICD-10-CM

## 2024-03-28 DIAGNOSIS — Z00.00 WELLNESS EXAMINATION: Primary | ICD-10-CM

## 2024-03-28 DIAGNOSIS — F41.9 ANXIETY AND DEPRESSION: ICD-10-CM

## 2024-03-28 PROCEDURE — 99395 PREV VISIT EST AGE 18-39: CPT | Mod: S$GLB,,, | Performed by: INTERNAL MEDICINE

## 2024-03-28 PROCEDURE — 99999 PR PBB SHADOW E&M-EST. PATIENT-LVL IV: CPT | Mod: PBBFAC,,, | Performed by: INTERNAL MEDICINE

## 2024-03-28 PROCEDURE — 3074F SYST BP LT 130 MM HG: CPT | Mod: CPTII,S$GLB,, | Performed by: INTERNAL MEDICINE

## 2024-03-28 PROCEDURE — 3008F BODY MASS INDEX DOCD: CPT | Mod: CPTII,S$GLB,, | Performed by: INTERNAL MEDICINE

## 2024-03-28 PROCEDURE — 3078F DIAST BP <80 MM HG: CPT | Mod: CPTII,S$GLB,, | Performed by: INTERNAL MEDICINE

## 2024-03-28 PROCEDURE — 1159F MED LIST DOCD IN RCRD: CPT | Mod: CPTII,S$GLB,, | Performed by: INTERNAL MEDICINE

## 2024-03-28 RX ORDER — VENLAFAXINE 37.5 MG/1
75 TABLET ORAL 2 TIMES DAILY
Qty: 180 TABLET | Refills: 1 | Status: SHIPPED | OUTPATIENT
Start: 2024-03-28 | End: 2024-05-15 | Stop reason: SDUPTHER

## 2024-03-28 NOTE — PATIENT INSTRUCTIONS
Psychiatry referral placed, given phone number [phone] to call and schedule       Effexor taper: decrease to 37.5 mg twice daily, after 1 month change to 37.5 mg daily, then change to 37.5 every other day x3 weeks, then 37.5 every third day for 2 weeks, then 1x a week for 2 weeks and stop      Think about propranolol (beat blocker) for situational anxiety and reduced somatic symptoms of stress

## 2024-03-28 NOTE — PROGRESS NOTES
[facility] Note    Chief Complaint      Chief Complaint   Patient presents with    Annual Exam     History of Present Illness      Anabel Frost is a 28 y.o. female who presents today for chief complaint annual wellness . .     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 She is on effexor doing ok, she would like to change to wellbutrin. Prev on lexapro. 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, she withdrew again this semester due to financial burden   -needs to fill out forms to reduce her insurance premium   -needs accommodations for doctors visits and flex time in the mornings   She is thinking of moving to [place], [place] next summer, she is from [place]     She using an online  feels exercise helps her mood   Active Problem List with Overview Notes    Diagnosis Date Noted    Otalgia 09/01/2022    Iron deficiency 08/25/2022    Tension headache 02/06/2022    Class 1 obesity 07/08/2021    MDD (major depressive disorder), recurrent episode, moderate 07/08/2021    Dietary iron deficiency  10/23/2020    Family history of hereditary hemorrhagic telangiectasia 10/23/2020     Maternal GF and maternal aunt      Fatigue 10/15/2020    Obstructive sleep apnea 10/15/2020    Migraine without aura and without status migrainosus, not intractable 2020     Usually monthly, photophobia and nausea   imitrex is abortive       Anxiety and depression 2020     Has been on ssri for 3 years, recently increased to 20 mg, better controlled          Health Maintenance   Topic Date Due    TETANUS VACCINE  Never done    Pap Smear  2026    Hepatitis C Screening  Completed    Lipid Panel  Completed       Past Medical History:   Diagnosis Date    Anxiety     Class 1 obesity 2021    Cluster headaches     Depression     Migraines        Past Surgical History:   Procedure Laterality Date    NO PAST SURGERIES         family history includes Anemia in her maternal grandfather and paternal aunt; Cancer in her paternal grandfather; Depression in her mother.    Social History     Tobacco Use    Smoking status: Never    Smokeless tobacco: Never   Substance Use Topics    Alcohol use: Not Currently     Comment: social    Drug use: Never       ROS     Outpatient Encounter Medications as of 3/28/2024   Medication Sig Dispense Refill    cyclobenzaprine (FLEXERIL) 10 MG tablet TAKE 1 TABLET (10 MG TOTAL) BY MOUTH NIGHTLY AS NEEDED FOR MUSCLE SPASMS (TENSION HEADACHE). 30 tablet 0    levonorgestreL (KYLEENA) 17.5 mcg/24 hrs (5 yrs) 19.5 mg IUD 1 each by Intrauterine route once.      promethazine (PHENERGAN) 12.5 MG Tab Take 1 tablet (12.5 mg total) by mouth every 6 (six) hours as needed (nausea). 90 tablet 1    sumatriptan (IMITREX) 100 MG tablet TAKE 1 TABLET BY MOUTH AT START OF HEADACHE MAY REPEAT AFTER 2 HOURS 20 tablet 0    [DISCONTINUED] venlafaxine (EFFEXOR) 75 MG tablet Take 1 tablet (75 mg total) by mouth 2 (two) times daily. 180 tablet 1    [] doxycycline (VIBRA-TABS) 100 MG tablet Take 1 tablet (100 mg  "total) by mouth 2 (two) times daily. for 7 days 14 each 0    [] naproxen (NAPROSYN) 500 MG tablet Take 1 tablet (500 mg total) by mouth 2 (two) times daily as needed (pain). 14 tablet 0    venlafaxine (EFFEXOR) 37.5 MG Tab Take 2 tablets (75 mg total) by mouth 2 (two) times daily. 180 tablet 1    [DISCONTINUED] desloratadine (CLARINEX) 5 mg tablet Take 1 tablet (5 mg total) by mouth once daily. for 7 days 7 tablet 0     No facility-administered encounter medications on file as of 3/28/2024.        Review of patient's allergies indicates:   Allergen Reactions    Amoxicillin Other (See Comments)    Keflex [cephalexin] Other (See Comments)     crying    Pertussis vaccines Other (See Comments)    Sulfa (sulfonamide antibiotics) Other (See Comments)           Physical Exam      Vital Signs  Pulse: 104  SpO2: 98 %  BP: 128/78  BP Location: Left arm  Patient Position: Sitting  Height and Weight  Height: 5' 5" (165.1 cm)  Weight: 84.5 kg (186 lb 4.6 oz)  BSA (Calculated - sq m): 1.97 sq meters  BMI (Calculated): 31  Weight in (lb) to have BMI = 25: 149.9]    Physical Exam  Vitals reviewed.   Constitutional:       General: She is not in acute distress.     Appearance: She is well-developed. She is not diaphoretic.   HENT:      Head: Normocephalic and atraumatic.      Right Ear: External ear normal.      Left Ear: External ear normal.      Nose: Nose normal.   Eyes:      General:         Right eye: No discharge.         Left eye: No discharge.      Conjunctiva/sclera: Conjunctivae normal.   Cardiovascular:      Rate and Rhythm: Normal rate and regular rhythm.      Heart sounds: Normal heart sounds.   Pulmonary:      Effort: Pulmonary effort is normal. No respiratory distress.      Breath sounds: Normal breath sounds.   Musculoskeletal:         General: Normal range of motion.      Cervical back: Normal range of motion.   Skin:     Coloration: Skin is not pale.      Findings: No rash.   Neurological:      Mental Status: " "She is alert and oriented to person, place, and time.   Psychiatric:         Behavior: Behavior normal.         Thought Content: Thought content normal.          Laboratory:  CBC:  Recent Labs   Lab Result Units 01/04/24  1617   WBC K/uL 9.72   RBC M/uL 4.97   Hemoglobin g/dL 14.5   Hematocrit % 44.8   Platelets K/uL 332   MCV fL 90   MCH pg 29.2   MCHC g/dL 32.4     CMP:  Recent Labs   Lab Result Units 03/18/24  1430 03/27/24  1606   Glucose mg/dL 91  --    Calcium mg/dL 9.0  --    Albumin g/dL 4.1 4.4   Total Protein g/dL 8.7* 8.0   Sodium mmol/L 138  --    Potassium mmol/L 4.5  --    CO2 mmol/L 22*  --    Chloride mmol/L 105  --    BUN mg/dL 17  --    Alkaline Phosphatase U/L 290* 140*   ALT U/L 381* 49*   AST U/L 209* 29   Total Bilirubin mg/dL 0.5 0.6     URINALYSIS:  No results for input(s): "COLORU", "CLARITYU", "SPECGRAV", "PHUR", "PROTEINUA", "GLUCOSEU", "BILIRUBINCON", "BLOODU", "WBCU", "RBCU", "BACTERIA", "MUCUS", "NITRITE", "LEUKOCYTESUR", "UROBILINOGEN", "HYALINECASTS" in the last 2160 hours.   LIPIDS:  Recent Labs   Lab Result Units 01/04/24  1617 03/18/24  1430   TSH uIU/mL 1.807  --    HDL mg/dL  --  27*   Cholesterol mg/dL  --  201*   Triglycerides mg/dL  --  598*   LDL Cholesterol mg/dL  --  Invalid, Trig>400.0   HDL/Cholesterol Ratio %  --  13.4*   Non-HDL Cholesterol mg/dL  --  174   Total Cholesterol/HDL Ratio   --  7.4*     TSH:  Recent Labs   Lab Result Units 01/04/24  1617   TSH uIU/mL 1.807     A1C:  No results for input(s): "HGBA1C" in the last 2160 hours.        Assessment/Plan     Anabel Frost is a 28 y.o.female with:    1. Wellness examination    2. Anxiety and depression  Overview:  Has been on ssri for 3 years, recently increased to 20 mg, better controlled     Orders:  -     venlafaxine (EFFEXOR) 37.5 MG Tab; Take 2 tablets (75 mg total) by mouth 2 (two) times daily.  Dispense: 180 tablet; Refill: 1    3. Inattention  -     Ambulatory referral/consult to Psychiatry; Future; " Expected date: 04/04/2024    4. Transaminitis  -     US Abdomen Complete; Future; Expected date: 03/28/2024    5. MDD (major depressive disorder), recurrent episode, moderate  Assessment & Plan:  Work on effexor taper and will change to wellbutrin            Use of the [facility] Patient Portal discussed and encouraged during today's visit  -Continue current medications and maintain follow up with specialists.  Return to clinic in 1 year .  No future appointments.    Romina Juares MD  3/28/2024 3:27 PM    Primary Care Internal Medicine

## 2024-04-02 ENCOUNTER — HOSPITAL ENCOUNTER (OUTPATIENT)
Dept: RADIOLOGY | Facility: OTHER | Age: 29
Discharge: HOME | End: 2024-04-02
Attending: INTERNAL MEDICINE
Payer: COMMERCIAL

## 2024-04-02 DIAGNOSIS — R74.01 TRANSAMINITIS: ICD-10-CM

## 2024-04-02 PROCEDURE — 76705 ECHO EXAM OF ABDOMEN: CPT | Mod: TC

## 2024-04-02 PROCEDURE — 76705 ECHO EXAM OF ABDOMEN: CPT | Mod: 26,,, | Performed by: RADIOLOGY

## 2024-04-18 DIAGNOSIS — G43.009 MIGRAINE WITHOUT AURA AND WITHOUT STATUS MIGRAINOSUS, NOT INTRACTABLE: ICD-10-CM

## 2024-04-18 DIAGNOSIS — G44.209 TENSION HEADACHE: ICD-10-CM

## 2024-04-19 RX ORDER — CYCLOBENZAPRINE HCL 10 MG
10 TABLET ORAL NIGHTLY PRN
Qty: 30 TABLET | Refills: 0 | Status: SHIPPED | OUTPATIENT
Start: 2024-04-19

## 2024-04-19 RX ORDER — SUMATRIPTAN SUCCINATE 100 MG/1
TABLET ORAL
Qty: 90 TABLET | Refills: 3 | Status: SHIPPED | OUTPATIENT
Start: 2024-04-19 | End: 2024-05-28 | Stop reason: SDUPTHER

## 2024-04-19 NOTE — TELEPHONE ENCOUNTER
No care due was identified.  Health [facility] Embedded Care Due Messages. Reference number: [id number].   4/18/2024 10:08:22 PM CDT

## 2024-04-19 NOTE — TELEPHONE ENCOUNTER
Refill Decision Note   Anabel Santosh  is requesting a refill authorization.  Brief Assessment and Rationale for Refill:  Approve     Medication Therapy Plan:         Comments:     Note composed:2:37 PM 04/19/2024

## 2024-04-19 NOTE — TELEPHONE ENCOUNTER
No care due was identified.  [facility] Embedded Care Due Messages. Reference number: [id number].   4/18/2024 10:09:37 PM CDT

## 2024-05-15 DIAGNOSIS — F32.A ANXIETY AND DEPRESSION: ICD-10-CM

## 2024-05-15 DIAGNOSIS — F41.9 ANXIETY AND DEPRESSION: ICD-10-CM

## 2024-05-15 RX ORDER — VENLAFAXINE 37.5 MG/1
75 TABLET ORAL 2 TIMES DAILY
Qty: 180 TABLET | Refills: 1 | Status: SHIPPED | OUTPATIENT
Start: 2024-05-15 | End: 2025-05-15

## 2024-05-15 NOTE — TELEPHONE ENCOUNTER
No care due was identified.  [facility] Embedded Care Due Messages. Reference number: [id number].   5/15/2024 7:35:40 AM CDT

## 2024-05-28 DIAGNOSIS — G43.009 MIGRAINE WITHOUT AURA AND WITHOUT STATUS MIGRAINOSUS, NOT INTRACTABLE: ICD-10-CM

## 2024-05-29 NOTE — TELEPHONE ENCOUNTER
No care due was identified.  [facility] Embedded Care Due Messages. Reference number: [id number].   5/28/2024 7:12:23 PM CDT

## 2024-05-30 RX ORDER — SUMATRIPTAN SUCCINATE 100 MG/1
TABLET ORAL
Qty: 90 TABLET | Refills: 3 | Status: SHIPPED | OUTPATIENT
Start: 2024-05-30

## 2024-06-06 ENCOUNTER — PATIENT MESSAGE (OUTPATIENT)
Dept: PRIMARY CARE CLINIC | Facility: CLINIC | Age: 29
End: 2024-06-06
Payer: COMMERCIAL

## 2024-06-07 ENCOUNTER — E-VISIT (OUTPATIENT)
Dept: PRIMARY CARE CLINIC | Facility: CLINIC | Age: 29
End: 2024-06-07
Payer: COMMERCIAL
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
## 2024-06-10 RX ORDER — MUPIROCIN 20 MG/G
OINTMENT TOPICAL 3 TIMES DAILY
Qty: 30 G | Refills: 1 | Status: SHIPPED | OUTPATIENT
Start: 2024-06-10 | End: 2024-06-19 | Stop reason: SDUPTHER

## 2024-06-10 RX ORDER — AZITHROMYCIN 250 MG/1
TABLET, FILM COATED ORAL
Qty: 6 TABLET | Refills: 0 | Status: SHIPPED | OUTPATIENT
Start: 2024-06-10 | End: 2024-06-15

## 2024-06-11 NOTE — PROGRESS NOTES
Patient ID: Anabel Frost is a 28 y.o. female.    Chief Complaint: Rash (Entered automatically based on patient selection in Patient Portal.)    The patient initiated a request through [employer] on 6/7/2024 for evaluation and management with a chief complaint of Rash (Entered automatically based on patient selection in Patient Portal.)     I evaluated the questionnaire submission on 06/10/2024  Rx sent   .    [facility] Peq Evisit Rash    6/7/2024  4:36 PM CDT - Filed by Patient   Do you agree to participate in an E-Visit? Yes   If you have any of the following symptoms, please present to your local emergency room or call 911:  I acknowledge   Are you pregnant, could you be pregnant, or are you breast feeding? None of the above   What is the main issue you would like addressed today? Rash   How would you describe your skin problem? Rash   When did your symptoms first appear? 6/5/2024   Where is it located?  Leg(s)   Does it itch? Yes   Does it hurt? No   Is there discharge or drainage? Yes   Describe the discharge or drainage. Yellow colored   Is there bleeding? No   Describe the character Clear fluid filled   Describe the color Pink   Has it changed over time? Grown in size   Frequency of skin problem Daily   Duration of the skin problem (how long does it stay when it is present) Minutes   I have had a new exposure to Insects/bugs   I have had a new exposure to No new exposures   What have you used to treat the skin problem? Bendryl   If you have used anything for treatment, has it helped the symptoms? No   Other generalized symptoms that you associate with the rash No other symptoms   Provide any additional information you feel is important. 8 week foster kitten may have scratched me / thought i had a cat scratch then bumps formed on the stratch and now its gotten bigger maybe cat scratch disease? Wanted to see if i could prevent it before it got to fever / swollen lymph node stage?   At least one photo is required for  treatment to be provided. You can upload a maximum of three photos of the affected area.     Are you able to take your vital signs? Yes   Systolic Blood Pressure:    Diastolic Blood Pressure:    Weight: 181   Height: 65   Pulse: 106   Temperature: 98   Respiration rate:    Pulse Oxygen:          No diagnosis found.     No orders of the defined types were placed in this encounter.           No follow-ups on file.      E-Visit Time Tracking:

## 2024-06-19 ENCOUNTER — OFFICE VISIT (OUTPATIENT)
Dept: PRIMARY CARE CLINIC | Facility: CLINIC | Age: 29
End: 2024-06-19
Payer: COMMERCIAL

## 2024-06-19 VITALS
HEART RATE: 93 BPM | SYSTOLIC BLOOD PRESSURE: 126 MMHG | DIASTOLIC BLOOD PRESSURE: 70 MMHG | BODY MASS INDEX: 30.78 KG/M2 | WEIGHT: 184.75 LBS | HEIGHT: 65 IN | OXYGEN SATURATION: 97 %
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
## 2024-06-19 DIAGNOSIS — L25.9 CONTACT DERMATITIS, UNSPECIFIED CONTACT DERMATITIS TYPE, UNSPECIFIED TRIGGER: Primary | ICD-10-CM

## 2024-06-19 PROCEDURE — 3078F DIAST BP <80 MM HG: CPT | Mod: CPTII,S$GLB,, | Performed by: INTERNAL MEDICINE

## 2024-06-19 PROCEDURE — 99999 PR PBB SHADOW E&M-EST. PATIENT-LVL III: CPT | Mod: PBBFAC,,, | Performed by: INTERNAL MEDICINE

## 2024-06-19 PROCEDURE — 3074F SYST BP LT 130 MM HG: CPT | Mod: CPTII,S$GLB,, | Performed by: INTERNAL MEDICINE

## 2024-06-19 PROCEDURE — 1159F MED LIST DOCD IN RCRD: CPT | Mod: CPTII,S$GLB,, | Performed by: INTERNAL MEDICINE

## 2024-06-19 PROCEDURE — 3008F BODY MASS INDEX DOCD: CPT | Mod: CPTII,S$GLB,, | Performed by: INTERNAL MEDICINE

## 2024-06-19 PROCEDURE — 99214 OFFICE O/P EST MOD 30 MIN: CPT | Mod: S$GLB,,, | Performed by: INTERNAL MEDICINE

## 2024-06-19 RX ORDER — LEVOCETIRIZINE DIHYDROCHLORIDE 5 MG/1
5 TABLET, FILM COATED ORAL 2 TIMES DAILY
Qty: 14 TABLET | Refills: 0 | Status: SHIPPED | OUTPATIENT
Start: 2024-06-19 | End: 2024-06-26

## 2024-06-19 RX ORDER — PREDNISONE 20 MG/1
TABLET ORAL
Qty: 10 TABLET | Refills: 0 | Status: SHIPPED | OUTPATIENT
Start: 2024-06-19

## 2024-06-19 RX ORDER — MUPIROCIN 20 MG/G
OINTMENT TOPICAL 3 TIMES DAILY
Qty: 30 G | Refills: 1 | Status: SHIPPED | OUTPATIENT
Start: 2024-06-19

## 2024-06-19 NOTE — PROGRESS NOTES
[facility] Note    Chief Complaint      Chief Complaint   Patient presents with    cat scratch     Left knee      History of Present Illness      Anabel Frost is a 28 y.o. female who presents today for chief complaint contact dermatitis . \    HPI   Rash on leg x1 week, improving with mupiocin but with no lesions and spreading  Appears to be contact dermatitis   Active Problem List with Overview Notes    Diagnosis Date Noted    Otalgia 09/01/2022    Iron deficiency 08/25/2022    Tension headache 02/06/2022    Class 1 obesity 07/08/2021    MDD (major depressive disorder), recurrent episode, moderate 07/08/2021    Dietary iron deficiency 10/23/2020    Family history of hereditary hemorrhagic telangiectasia 10/23/2020     Maternal GF and maternal aunt      Fatigue 10/15/2020    Obstructive sleep apnea 10/15/2020    Migraine without aura and without status migrainosus, not intractable 07/16/2020     Usually monthly, photophobia and nausea   imitrex is abortive       Anxiety and depression 07/16/2020     Has been on ssri for 3 years, recently increased to 20 mg, better controlled          Health Maintenance   Topic Date Due    TETANUS VACCINE  Never done    Pap Smear  01/13/2026    Hepatitis C Screening  Completed    Lipid Panel  Completed       Past Medical History:   Diagnosis Date    Anxiety     Class 1 obesity 7/8/2021    Cluster headaches     Depression     Migraines        Past Surgical History:   Procedure Laterality Date    NO PAST SURGERIES         family history includes Anemia in her maternal grandfather and paternal aunt; Cancer in her paternal grandfather; Depression in her mother.    Social History     Tobacco Use    Smoking status: Never    Smokeless tobacco: Never   Substance Use Topics    Alcohol use: Not Currently     Comment: social    Drug use: Never       Review of Systems   Constitutional:  Negative for chills, fever, malaise/fatigue and weight loss.   Respiratory:   Negative for cough, sputum production, shortness of breath and wheezing.    Cardiovascular:  Negative for chest pain, palpitations, orthopnea and leg swelling.   Gastrointestinal:  Negative for constipation, diarrhea, nausea and vomiting.   Genitourinary:  Negative for dysuria, frequency, hematuria and urgency.   Skin:  Positive for itching and rash.        Outpatient Encounter Medications as of 6/19/2024   Medication Sig Dispense Refill    cyclobenzaprine (FLEXERIL) 10 MG tablet TAKE 1 TABLET (10 MG TOTAL) BY MOUTH NIGHTLY AS NEEDED FOR MUSCLE SPASMS (TENSION HEADACHE). 30 tablet 0    levonorgestreL (KYLEENA) 17.5 mcg/24 hrs (5 yrs) 19.5 mg IUD 1 each by Intrauterine route once.      promethazine (PHENERGAN) 12.5 MG Tab Take 1 tablet (12.5 mg total) by mouth every 6 (six) hours as needed (nausea). 90 tablet 1    sumatriptan (IMITREX) 100 MG tablet TAKE 1 TABLET BY MOUTH AT START OF HEADACHE MAY REPEAT AFTER 2 HOURS 90 tablet 3    venlafaxine (EFFEXOR) 37.5 MG Tab Take 2 tablets (75 mg total) by mouth 2 (two) times daily. 180 tablet 1    [DISCONTINUED] mupirocin (BACTROBAN) 2 % ointment Apply topically 3 (three) times daily. 30 g 1    levocetirizine (XYZAL) 5 MG tablet Take 1 tablet (5 mg total) by mouth 2 (two) times a day. for 7 days 14 tablet 0    mupirocin (BACTROBAN) 2 % ointment Apply topically 3 (three) times daily. 30 g 1    predniSONE (DELTASONE) 20 MG tablet 40 mg a day for 3 days and then 20 mg a day for 3 day and then 10 mg a day for 2 days 10 tablet 0     No facility-administered encounter medications on file as of 6/19/2024.        Review of patient's allergies indicates:   Allergen Reactions    Amoxicillin Other (See Comments)    Keflex [cephalexin] Other (See Comments)     crying    Pertussis vaccines Other (See Comments)    Sulfa (sulfonamide antibiotics) Other (See Comments)           Physical Exam      Vital Signs  Pulse: 93  SpO2: 97 %  BP: 126/70  BP Location: Left arm  Patient Position:  "Sitting  Height and Weight  Height: 5' 5" (165.1 cm)  Weight: 83.8 kg (184 lb 11.9 oz)  BSA (Calculated - sq m): 1.96 sq meters  BMI (Calculated): 30.7  Weight in (lb) to have BMI = 25: 149.9]    Physical Exam  Vitals reviewed.   Constitutional:       General: She is not in acute distress.     Appearance: She is well-developed. She is not diaphoretic.   HENT:      Head: Normocephalic and atraumatic.      Right Ear: External ear normal.      Left Ear: External ear normal.      Nose: Nose normal.   Eyes:      General:         Right eye: No discharge.         Left eye: No discharge.      Conjunctiva/sclera: Conjunctivae normal.   Pulmonary:      Effort: Pulmonary effort is normal. No respiratory distress.   Musculoskeletal:         General: Normal range of motion.      Cervical back: Normal range of motion.   Skin:     Coloration: Skin is not pale.      Findings: Rash present.   Neurological:      Mental Status: She is alert and oriented to person, place, and time.   Psychiatric:         Behavior: Behavior normal.         Thought Content: Thought content normal.          Laboratory:  CBC:  No results for input(s): "WBC", "RBC", "HGB", "HCT", "PLT", "MCV", "MCH", "MCHC" in the last 2160 hours.  CMP:  Recent Labs   Lab Result Units 03/27/24  1606   Albumin g/dL 4.4   Total Protein g/dL 8.0   Alkaline Phosphatase U/L 140*   ALT U/L 49*   AST U/L 29   Total Bilirubin mg/dL 0.6     URINALYSIS:  No results for input(s): "COLORU", "CLARITYU", "SPECGRAV", "PHUR", "PROTEINUA", "GLUCOSEU", "BILIRUBINCON", "BLOODU", "WBCU", "RBCU", "BACTERIA", "MUCUS", "NITRITE", "LEUKOCYTESUR", "UROBILINOGEN", "HYALINECASTS" in the last 2160 hours.   LIPIDS:  No results for input(s): "TSH", "HDL", "CHOL", "TRIG", "LDLCALC", "CHOLHDL", "NONHDLCHOL", "TOTALCHOLEST" in the last 2160 hours.  TSH:  No results for input(s): "TSH" in the last 2160 hours.  A1C:  No results for input(s): "HGBA1C" in the last 2160 hours.    Radiology:      Assessment/Plan "     Anabel Frost is a 28 y.o.female with:    1. Contact dermatitis, unspecified contact dermatitis type, unspecified trigger  -     predniSONE (DELTASONE) 20 MG tablet; 40 mg a day for 3 days and then 20 mg a day for 3 day and then 10 mg a day for 2 days  Dispense: 10 tablet; Refill: 0  -     levocetirizine (XYZAL) 5 MG tablet; Take 1 tablet (5 mg total) by mouth 2 (two) times a day. for 7 days  Dispense: 14 tablet; Refill: 0    2. Cat scratch  -     mupirocin (BACTROBAN) 2 % ointment; Apply topically 3 (three) times daily.  Dispense: 30 g; Refill: 1         Use of the [facility] Patient Portal discussed and encouraged during today's visit  -Continue current medications and maintain follow up with specialists.  Return to clinic in .  No future appointments.    Romina Juares MD  6/19/2024 9:07 AM    Primary Care Internal Medicine

## 2024-06-28 ENCOUNTER — PATIENT MESSAGE (OUTPATIENT)
Dept: PRIMARY CARE CLINIC | Facility: CLINIC | Age: 29
End: 2024-06-28
Payer: COMMERCIAL

## 2024-06-28 DIAGNOSIS — F41.9 ANXIETY AND DEPRESSION: Primary | ICD-10-CM

## 2024-06-28 DIAGNOSIS — F32.A ANXIETY AND DEPRESSION: Primary | ICD-10-CM

## 2024-07-01 RX ORDER — BUPROPION HYDROCHLORIDE 150 MG/1
150 TABLET ORAL DAILY
Qty: 90 TABLET | Refills: 3 | Status: SHIPPED | OUTPATIENT
Start: 2024-07-01 | End: 2025-07-01

## 2024-07-05 NOTE — TELEPHONE ENCOUNTER
Informed pt. Of results and recommendations, verbalized understanding. Is still concerned of weight loss. Agrees to keep scheduled appt. On 9/14/21 to follow up.   Pt asking if she has to taper the Venlafaxine or just start the Wellbutrin?

## 2024-08-22 ENCOUNTER — E-VISIT (OUTPATIENT)
Dept: FAMILY MEDICINE | Facility: CLINIC | Age: 29
End: 2024-08-22
Payer: COMMERCIAL

## 2024-08-22 DIAGNOSIS — H66.90 OTITIS MEDIA, UNSPECIFIED LATERALITY, UNSPECIFIED OTITIS MEDIA TYPE: Primary | ICD-10-CM

## 2024-08-22 RX ORDER — NAPROXEN 500 MG/1
500 TABLET ORAL 2 TIMES DAILY PRN
Qty: 14 TABLET | Refills: 0 | Status: SHIPPED | OUTPATIENT
Start: 2024-08-22 | End: 2024-08-29

## 2024-08-22 RX ORDER — CIPROFLOXACIN HYDROCHLORIDE 3 MG/ML
SOLUTION/ DROPS OPHTHALMIC
Qty: 10 ML | Refills: 0 | Status: SHIPPED | OUTPATIENT
Start: 2024-08-22

## 2024-08-22 NOTE — PROGRESS NOTES
Patient ID: Anabel Frost is a 28 y.o. female.    Chief Complaint: General Illness (Entered automatically based on patient selection in [employer].)          274}  The patient initiated a request through [employer] on 8/22/2024 for evaluation and management with a chief complaint of General Illness (Entered automatically based on patient selection in [employer].)     I evaluated the questionnaire submission on 08/22/2024 .    Total Time (in minutes): 12     [facility] Evisit [facility]-Common Problems    8/22/2024  8:04 AM CDT - Filed by Patient   What do you need help with? Other Concern   Do you agree to participate in an E-Visit? Yes   If you have any of the following symptoms, please present to your local emergency room or call 911:  I acknowledge   Are you pregnant, could you be pregnant, or are you breast feeding? None of the above   What is the main issue you would like addressed today? Earache / ear infection   Please describe your symptoms Was in [place] since last Thursday and ear started hurting on Sunday. It feels full and throbs with pain if not on pain meds. [place] antispetic ear drops [place]. Still waking up from ear pain as of last night w symptoms not improving   Where is your problem located? Ear   How severe are your symptoms? Moderate   Have you had these symptoms before? Yes   How long have you been having these symptoms? For a week   Please list any medications or treatments you have used for your condition and indicate if it was effective or not. Tylenol, ear drops   What makes this feel better? Tylenol   What makes this feel worse? Getting wet   Are these symptoms related to a condition that you currently have? No   Please describe any probable cause for these symptoms Something irritated it swimming in ocean/pool   Provide any additional information you feel is important.    Please attach any relevant images or files    Are you able to take your vital signs? No          Active  Problem List with Overview Notes    Diagnosis Date Noted    Otalgia 09/01/2022    Iron deficiency 08/25/2022    Tension headache 02/06/2022    Class 1 obesity 07/08/2021    MDD (major depressive disorder), recurrent episode, moderate 07/08/2021    Dietary iron deficiency 10/23/2020    Family history of hereditary hemorrhagic telangiectasia 10/23/2020     Maternal GF and maternal aunt      Fatigue 10/15/2020    Obstructive sleep apnea 10/15/2020    Migraine without aura and without status migrainosus, not intractable 07/16/2020     Usually monthly, photophobia and nausea   imitrex is abortive       Anxiety and depression 07/16/2020     Has been on ssri for 3 years, recently increased to 20 mg, better controlled         Recent Labs Obtained:  Lab Results   Component Value Date    WBC 9.72 01/04/2024    HGB 14.5 01/04/2024    HCT 44.8 01/04/2024    MCV 90 01/04/2024     01/04/2024     03/18/2024    K 4.5 03/18/2024    GLU 91 03/18/2024    CREATININE 1.0 03/18/2024    EGFRNORACEVR >60 03/18/2024    HGBA1C 5.0 02/20/2023    TSH 1.807 01/04/2024      Review of patient's allergies indicates:   Allergen Reactions    Amoxicillin Other (See Comments)    Keflex [cephalexin] Other (See Comments)     crying    Pertussis vaccines Other (See Comments)    Sulfa (sulfonamide antibiotics) Other (See Comments)       Encounter Diagnosis   Name Primary?    Otitis media, unspecified laterality, unspecified otitis media type Yes        No orders of the defined types were placed in this encounter.     Medications Ordered This Encounter   Medications    ciprofloxacin HCl (CILOXAN) 0.3 % ophthalmic solution     Sig: Apply 5 drops to the affected EAR twice daily for 1 week (not for ophthalmic use)     Dispense:  10 mL     Refill:  0    naproxen (NAPROSYN) 500 MG tablet     Sig: Take 1 tablet (500 mg total) by mouth 2 (two) times daily as needed (pain).     Dispense:  14 tablet     Refill:  0        E-Visit Time Tracking:    Day 1  Time (in minutes): 12    Total Time (in minutes): 12      196}

## 2024-10-04 ENCOUNTER — OFFICE VISIT (OUTPATIENT)
Dept: PRIMARY CARE CLINIC | Facility: CLINIC | Age: 29
End: 2024-10-04
Payer: COMMERCIAL

## 2024-10-04 VITALS
HEART RATE: 88 BPM | WEIGHT: 185.19 LBS | DIASTOLIC BLOOD PRESSURE: 72 MMHG | BODY MASS INDEX: 30.85 KG/M2 | SYSTOLIC BLOOD PRESSURE: 128 MMHG | HEIGHT: 65 IN | OXYGEN SATURATION: 100 %

## 2024-10-04 DIAGNOSIS — F32.A ANXIETY AND DEPRESSION: ICD-10-CM

## 2024-10-04 DIAGNOSIS — F41.9 ANXIETY AND DEPRESSION: ICD-10-CM

## 2024-10-04 DIAGNOSIS — G43.009 MIGRAINE WITHOUT AURA AND WITHOUT STATUS MIGRAINOSUS, NOT INTRACTABLE: Primary | ICD-10-CM

## 2024-10-04 PROCEDURE — 99999 PR PBB SHADOW E&M-EST. PATIENT-LVL III: CPT | Mod: PBBFAC,,, | Performed by: INTERNAL MEDICINE

## 2024-10-04 RX ORDER — BUPROPION HYDROCHLORIDE 300 MG/1
300 TABLET ORAL DAILY
Qty: 90 TABLET | Refills: 3 | Status: SHIPPED | OUTPATIENT
Start: 2024-10-04 | End: 2025-10-04

## 2024-10-04 NOTE — PROGRESS NOTES
[facility] Note    Chief Complaint      Chief Complaint   Patient presents with    fmla     History of Present Illness      Anabel Frost is a 28 y.o. female who presents today for chief complaint headaches .     HPI   Last seen 4.24 annual   Here today to renew FMLA for intermittent leave for headaches   Forms scanned in     She finally weaned off effexor feeling ok, wants to increase wellbutrin to 300  Active Problem List with Overview Notes    Diagnosis Date Noted    Otalgia 09/01/2022    Iron deficiency 08/25/2022    Tension headache 02/06/2022    Class 1 obesity 07/08/2021    MDD (major depressive disorder), recurrent episode, moderate 07/08/2021    Dietary iron deficiency 10/23/2020    Family history of hereditary hemorrhagic telangiectasia 10/23/2020     Maternal GF and maternal aunt      Fatigue 10/15/2020    Obstructive sleep apnea 10/15/2020    Migraine without aura and without status migrainosus, not intractable 07/16/2020     Usually monthly, photophobia and nausea   imitrex is abortive       Anxiety and depression 07/16/2020     Has been on ssri for 3 years, recently increased to 20 mg, better controlled          Health Maintenance   Topic Date Due    TETANUS VACCINE  Never done    Pap Smear  01/13/2026    Hepatitis C Screening  Completed    Lipid Panel  Completed       Past Medical History:   Diagnosis Date    Anxiety     Class 1 obesity 7/8/2021    Cluster headaches     Depression     Migraines        Past Surgical History:   Procedure Laterality Date    NO PAST SURGERIES         family history includes Anemia in her maternal grandfather and paternal aunt; Cancer in her paternal grandfather; Depression in her mother.    Social History     Tobacco Use    Smoking status: Never    Smokeless tobacco: Never   Substance Use Topics    Alcohol use: Not Currently     Comment: social    Drug use: Never       Review of Systems   Constitutional:  Negative for chills, fever,  malaise/fatigue and weight loss.   Respiratory:  Negative for cough, sputum production, shortness of breath and wheezing.    Cardiovascular:  Negative for chest pain, palpitations, orthopnea and leg swelling.   Gastrointestinal:  Negative for constipation, diarrhea, nausea and vomiting.   Genitourinary:  Negative for dysuria, frequency, hematuria and urgency.        Outpatient Encounter Medications as of 10/4/2024   Medication Sig Dispense Refill    cyclobenzaprine (FLEXERIL) 10 MG tablet TAKE 1 TABLET (10 MG TOTAL) BY MOUTH NIGHTLY AS NEEDED FOR MUSCLE SPASMS (TENSION HEADACHE). 30 tablet 0    levonorgestreL (KYLEENA) 17.5 mcg/24 hrs (5 yrs) 19.5 mg IUD 1 each by Intrauterine route once.      mupirocin (BACTROBAN) 2 % ointment Apply topically 3 (three) times daily. 30 g 1    promethazine (PHENERGAN) 12.5 MG Tab Take 1 tablet (12.5 mg total) by mouth every 6 (six) hours as needed (nausea). 90 tablet 1    sumatriptan (IMITREX) 100 MG tablet TAKE 1 TABLET BY MOUTH AT START OF HEADACHE MAY REPEAT AFTER 2 HOURS 90 tablet 3    [DISCONTINUED] buPROPion (WELLBUTRIN XL) 150 MG TB24 tablet Take 1 tablet (150 mg total) by mouth once daily. 90 tablet 3    buPROPion (WELLBUTRIN XL) 300 MG 24 hr tablet Take 1 tablet (300 mg total) by mouth once daily. 90 tablet 3    levocetirizine (XYZAL) 5 MG tablet Take 1 tablet (5 mg total) by mouth 2 (two) times a day. for 7 days 14 tablet 0    [DISCONTINUED] ciprofloxacin HCl (CILOXAN) 0.3 % ophthalmic solution Apply 5 drops to the affected EAR twice daily for 1 week (not for ophthalmic use) 10 mL 0    [DISCONTINUED] predniSONE (DELTASONE) 20 MG tablet 40 mg a day for 3 days and then 20 mg a day for 3 day and then 10 mg a day for 2 days 10 tablet 0    [DISCONTINUED] venlafaxine (EFFEXOR) 37.5 MG Tab Take 2 tablets (75 mg total) by mouth 2 (two) times daily. (Patient not taking: Reported on 10/4/2024) 180 tablet 1     No facility-administered encounter medications on file as of 10/4/2024.     "    Review of patient's allergies indicates:   Allergen Reactions    Amoxicillin Other (See Comments)    Keflex [cephalexin] Other (See Comments)     crying    Pertussis vaccines Other (See Comments)    Sulfa (sulfonamide antibiotics) Other (See Comments)           Physical Exam      Vital Signs  Pulse: 88  SpO2: 100 %  BP: 128/72  BP Location: Left arm  Patient Position: Sitting  Height and Weight  Height: 5' 5" (165.1 cm)  Weight: 84 kg (185 lb 3 oz)  BSA (Calculated - sq m): 1.96 sq meters  BMI (Calculated): 30.8  Weight in (lb) to have BMI = 25: 149.9]    Physical Exam  Vitals reviewed.   Constitutional:       General: She is not in acute distress.     Appearance: She is well-developed. She is not diaphoretic.   HENT:      Head: Normocephalic and atraumatic.      Right Ear: External ear normal.      Left Ear: External ear normal.      Nose: Nose normal.   Eyes:      General:         Right eye: No discharge.         Left eye: No discharge.      Conjunctiva/sclera: Conjunctivae normal.   Pulmonary:      Effort: Pulmonary effort is normal. No respiratory distress.   Musculoskeletal:         General: Normal range of motion.      Cervical back: Normal range of motion.   Skin:     Coloration: Skin is not pale.      Findings: No rash.   Neurological:      Mental Status: She is alert and oriented to person, place, and time.   Psychiatric:         Behavior: Behavior normal.         Thought Content: Thought content normal.            Assessment/Plan     Anabel Frost is a 28 y.o.female with:    1. Migraine without aura and without status migrainosus, not intractable  Overview:  Usually monthly, photophobia and nausea   imitrex is abortive     Assessment & Plan:  Fmla       2. Anxiety and depression  Overview:  Has been on ssri for 3 years, recently increased to 20 mg, better controlled     Assessment & Plan:  Off effexor, increase wellbutrin to 300     Orders:  -     buPROPion (WELLBUTRIN XL) 300 MG 24 hr tablet; Take " 1 tablet (300 mg total) by mouth once daily.  Dispense: 90 tablet; Refill: 3       No future appointments.    Romina Juares MD  10/4/2024 1:21 PM    Primary Care Internal Medicine

## 2024-11-26 ENCOUNTER — PATIENT MESSAGE (OUTPATIENT)
Dept: PRIMARY CARE CLINIC | Facility: CLINIC | Age: 29
End: 2024-11-26
Payer: COMMERCIAL

## 2024-11-26 DIAGNOSIS — G43.009 MIGRAINE WITHOUT AURA AND WITHOUT STATUS MIGRAINOSUS, NOT INTRACTABLE: ICD-10-CM

## 2024-11-27 NOTE — TELEPHONE ENCOUNTER
Lov 10/4/24  Pharmacy change. Pt is going out of town for the holiday and left her medication at home

## 2024-11-27 NOTE — TELEPHONE ENCOUNTER
No care due was identified.  Health [facility] Embedded Care Due Messages. Reference number: [id number].   11/27/2024 6:49:26 AM CST

## 2024-12-01 RX ORDER — SUMATRIPTAN SUCCINATE 100 MG/1
TABLET ORAL
Qty: 90 TABLET | Refills: 0 | Status: SHIPPED | OUTPATIENT
Start: 2024-12-01

## 2024-12-18 DIAGNOSIS — G43.009 MIGRAINE WITHOUT AURA AND WITHOUT STATUS MIGRAINOSUS, NOT INTRACTABLE: ICD-10-CM

## 2024-12-19 RX ORDER — SUMATRIPTAN SUCCINATE 100 MG/1
TABLET ORAL
Qty: 90 TABLET | Refills: 1 | Status: SHIPPED | OUTPATIENT
Start: 2024-12-19

## 2024-12-19 NOTE — TELEPHONE ENCOUNTER
No care due was identified.  Health [facility] Embedded Care Due Messages. Reference number: [id number].   12/18/2024 8:32:56 PM CST

## 2025-01-09 DIAGNOSIS — G43.009 MIGRAINE WITHOUT AURA AND WITHOUT STATUS MIGRAINOSUS, NOT INTRACTABLE: ICD-10-CM

## 2025-01-10 RX ORDER — SUMATRIPTAN SUCCINATE 100 MG/1
TABLET ORAL
Qty: 27 TABLET | Refills: 3 | Status: SHIPPED | OUTPATIENT
Start: 2025-01-10

## 2025-01-10 NOTE — TELEPHONE ENCOUNTER
No care due was identified.  Health [facility] Embedded Care Due Messages. Reference number: [id number].   1/09/2025 6:51:21 PM CST

## 2025-01-10 NOTE — TELEPHONE ENCOUNTER
Refill Decision Note   Anabel Frost  is requesting a refill authorization.  Brief Assessment and Rationale for Refill:  Approve     Medication Therapy Plan:         Comments:     Note composed:9:23 AM 01/10/2025             Appointments     Last Visit   10/4/2024 Romina Juares MD   Next Visit   Visit date not found Romina Juares MD

## 2025-03-14 ENCOUNTER — PATIENT MESSAGE (OUTPATIENT)
Dept: SURGERY | Facility: CLINIC | Age: 30
End: 2025-03-14
Payer: COMMERCIAL

## 2025-04-03 ENCOUNTER — PATIENT MESSAGE (OUTPATIENT)
Dept: PRIMARY CARE CLINIC | Facility: CLINIC | Age: 30
End: 2025-04-03

## 2025-04-03 ENCOUNTER — OFFICE VISIT (OUTPATIENT)
Dept: PRIMARY CARE CLINIC | Facility: CLINIC | Age: 30
End: 2025-04-03
Payer: COMMERCIAL

## 2025-04-03 VITALS
BODY MASS INDEX: 29.13 KG/M2 | SYSTOLIC BLOOD PRESSURE: 122 MMHG | HEART RATE: 92 BPM | DIASTOLIC BLOOD PRESSURE: 85 MMHG | WEIGHT: 175.06 LBS | TEMPERATURE: 99 F | OXYGEN SATURATION: 97 %

## 2025-04-03 DIAGNOSIS — J10.1 INFLUENZA B: Primary | ICD-10-CM

## 2025-04-03 DIAGNOSIS — R05.1 ACUTE COUGH: ICD-10-CM

## 2025-04-03 PROBLEM — F33.1 MDD (MAJOR DEPRESSIVE DISORDER), RECURRENT EPISODE, MODERATE: Status: RESOLVED | Noted: 2021-07-08 | Resolved: 2025-04-03

## 2025-04-03 LAB
CTP QC/QA: YES
CTP QC/QA: YES
POC MOLECULAR INFLUENZA A AGN: NEGATIVE
POC MOLECULAR INFLUENZA B AGN: POSITIVE
SARS-COV-2 RDRP RESP QL NAA+PROBE: NEGATIVE

## 2025-04-03 PROCEDURE — 99999 PR PBB SHADOW E&M-EST. PATIENT-LVL III: CPT | Mod: PBBFAC,,,

## 2025-04-03 RX ORDER — PROMETHAZINE HYDROCHLORIDE AND DEXTROMETHORPHAN HYDROBROMIDE 6.25; 15 MG/5ML; MG/5ML
5 SYRUP ORAL NIGHTLY PRN
Qty: 118 ML | Refills: 0 | Status: SHIPPED | OUTPATIENT
Start: 2025-04-03 | End: 2025-04-27

## 2025-04-03 RX ORDER — BENZONATATE 100 MG/1
100 CAPSULE ORAL 3 TIMES DAILY PRN
Qty: 20 CAPSULE | Refills: 0 | Status: SHIPPED | OUTPATIENT
Start: 2025-04-03 | End: 2025-04-13

## 2025-04-03 RX ORDER — PREDNISONE 20 MG/1
20 TABLET ORAL DAILY
Qty: 5 TABLET | Refills: 0 | Status: SHIPPED | OUTPATIENT
Start: 2025-04-03 | End: 2025-04-08

## 2025-04-03 NOTE — PROGRESS NOTES
"[facility]NAL [facility]IC[facility]    Reason for visit:   Chief Complaint   Patient presents with    Cough     HPI: Anabel Frost is a 29 y.o. female presenting today for URI symptoms.    Patient is an established patient of PCP, Dr. Remedios Juares, [facility]. This patient is new to me.    She reports respiratory symptoms began last Thursday. She experienced fever on Sunday and Tuesday, along with a productive cough with green phlegm, worse in the morning and at night. She had superficial chest pain with coughing over the weekend. She describes a pressure sensation in her head feeling "heavy" but denies sinus pain. Her sleep was initially affected over the weekend but has since improved.    Medications-  She is taking DayQuil daily in the morning and Robitussin for symptom management.      ROS:  General: +fever, -chills, -fatigue, -weight gain, -weight loss, +sleep disturbances  Eyes: -vision changes, -redness, -discharge  ENT: -ear pain, -nasal congestion, -sore throat, +post nasal drip  Cardiovascular: +chest pain, -palpitations, -lower extremity edema  Respiratory: +cough, -shortness of breath, +productive cough, +sputum production, +waking at night coughing  Gastrointestinal: -abdominal pain, -nausea, -vomiting, -diarrhea, -constipation, -blood in stool  Genitourinary: -dysuria, -hematuria, -frequency  Musculoskeletal: -joint pain, -muscle pain, +body aches  Skin: -rash, -lesion  Neurological: -headache, -dizziness, -numbness, -tingling  Psychiatric: -anxiety, -depression, -sleep difficulty          Social History     Social History Narrative    Not on file       ALLERGIES:   Review of patient's allergies indicates:   Allergen Reactions    Amoxicillin Other (See Comments)    Keflex [cephalexin] Other (See Comments)     crying    Pertussis vaccines Other (See Comments)    Sulfa (sulfonamide antibiotics) Other (See Comments)       MEDS:   Current Outpatient Medications on File Prior to Visit "   Medication Sig Dispense Refill Last Dose/Taking    buPROPion (WELLBUTRIN XL) 300 MG 24 hr tablet Take 1 tablet (300 mg total) by mouth once daily. 90 tablet 3 Taking    cyclobenzaprine (FLEXERIL) 10 MG tablet TAKE 1 TABLET (10 MG TOTAL) BY MOUTH NIGHTLY AS NEEDED FOR MUSCLE SPASMS (TENSION HEADACHE). 30 tablet 0 Taking As Needed    levonorgestreL (KYLEENA) 17.5 mcg/24 hrs (5 yrs) 19.5 mg IUD 1 each by Intrauterine route once.   Taking    mupirocin (BACTROBAN) 2 % ointment Apply topically 3 (three) times daily. 30 g 1 Taking    sumatriptan (IMITREX) 100 MG tablet TAKE 1 TABLET BY MOUTH AT START OF HEADACHE MAY REPEAT AFTER 2 HOURS 27 tablet 3 Taking    [DISCONTINUED] promethazine (PHENERGAN) 12.5 MG Tab Take 1 tablet (12.5 mg total) by mouth every 6 (six) hours as needed (nausea). 90 tablet 1 Taking As Needed    levocetirizine (XYZAL) 5 MG tablet Take 1 tablet (5 mg total) by mouth 2 (two) times a day. for 7 days 14 tablet 0        Vital signs:   Vitals:    04/03/25 1401   BP: 122/85   Patient Position: Sitting   Pulse: 92   Temp: 99.2 °F (37.3 °C)   SpO2: 97%   Weight: 79.4 kg (175 lb 0.7 oz)     Body mass index is 29.13 kg/m².    PHYSICAL EXAM:     Physical Exam  Vitals reviewed.   Constitutional:       Appearance: Normal appearance. She is ill-appearing. She is not diaphoretic.   HENT:      Head: Normocephalic and atraumatic.      Right Ear: Tympanic membrane, ear canal and external ear normal. There is no impacted cerumen.      Left Ear: Tympanic membrane, ear canal and external ear normal. There is no impacted cerumen.      Nose: No congestion.      Mouth/Throat:      Pharynx: Posterior oropharyngeal erythema present. No oropharyngeal exudate.   Cardiovascular:      Rate and Rhythm: Normal rate.      Heart sounds: Normal heart sounds. No murmur heard.     No friction rub. No gallop.   Pulmonary:      Effort: Pulmonary effort is normal. No respiratory distress.      Breath sounds: Normal breath sounds. No  wheezing, rhonchi or rales.   Skin:     Coloration: Skin is pale.      Findings: No erythema or rash.   Neurological:      Mental Status: She is alert and oriented to person, place, and time.   Psychiatric:         Mood and Affect: Mood normal.         Behavior: Behavior normal.         Thought Content: Thought content normal.         Judgment: Judgment normal.           PERTINENT RESULTS:   Office Visit on 04/03/2025   Component Date Value Ref Range Status    POC Rapid COVID 04/03/2025 Negative  Negative Final     Acceptable 04/03/2025 Yes   Final    POC Molecular Influenza A Ag 04/03/2025 Negative  Negative Final    POC Molecular Influenza B Ag 04/03/2025 Positive (A)  Negative Final     Acceptable 04/03/2025 Yes   Final       ASSESSMENT/PLAN:    IMPRESSION:  - Assessed symptoms as consistent with viral upper respiratory infection, confirmed by positive influenza B test.  - Evaluated and initiated short course of oral steroids (prednisone 20 mg daily for 5 days) to reduce airway inflammation and cough severity.        1. Influenza B  -     promethazine-dextromethorphan (PROMETHAZINE-DM) 6.25-15 mg/5 mL Syrp; Take 5 mLs by mouth nightly as needed (cough).  Dispense: 118 mL; Refill: 0  -     benzonatate (TESSALON) 100 MG capsule; Take 1 capsule (100 mg total) by mouth 3 (three) times daily as needed for Cough.  Dispense: 20 capsule; Refill: 0  -     predniSONE (DELTASONE) 20 MG tablet; Take 1 tablet (20 mg total) by mouth once daily. for 5 days  Dispense: 5 tablet; Refill: 0    2. Acute cough  -     POCT COVID-19 Rapid Screening  -     POCT Influenza A/B Molecular      Vaccines recommended: Flu, tetanus, Covid-19 when recovered from current illness    Follow up if symptoms worsen or fail to improve.     MARK Suazo   Internal Medicine

## 2025-04-07 ENCOUNTER — OFFICE VISIT (OUTPATIENT)
Dept: INTERNAL MEDICINE | Facility: CLINIC | Age: 30
End: 2025-04-07
Payer: COMMERCIAL

## 2025-04-07 ENCOUNTER — LAB VISIT (OUTPATIENT)
Dept: LAB | Facility: HOSPITAL | Age: 30
End: 2025-04-07
Payer: COMMERCIAL

## 2025-04-07 VITALS
HEIGHT: 65 IN | DIASTOLIC BLOOD PRESSURE: 79 MMHG | HEART RATE: 89 BPM | OXYGEN SATURATION: 98 % | WEIGHT: 175.25 LBS | SYSTOLIC BLOOD PRESSURE: 130 MMHG | BODY MASS INDEX: 29.2 KG/M2

## 2025-04-07 DIAGNOSIS — G43.009 MIGRAINE WITHOUT AURA AND WITHOUT STATUS MIGRAINOSUS, NOT INTRACTABLE: ICD-10-CM

## 2025-04-07 DIAGNOSIS — N64.52 DISCHARGE FROM LEFT NIPPLE: ICD-10-CM

## 2025-04-07 DIAGNOSIS — F32.A ANXIETY AND DEPRESSION: ICD-10-CM

## 2025-04-07 DIAGNOSIS — Z00.00 ENCOUNTER FOR ANNUAL PHYSICAL EXAM: ICD-10-CM

## 2025-04-07 DIAGNOSIS — F41.9 ANXIETY AND DEPRESSION: ICD-10-CM

## 2025-04-07 DIAGNOSIS — Z00.00 ENCOUNTER FOR ANNUAL PHYSICAL EXAM: Primary | ICD-10-CM

## 2025-04-07 LAB
ALBUMIN SERPL BCP-MCNC: 4.3 G/DL (ref 3.5–5.2)
ALP SERPL-CCNC: 84 UNIT/L (ref 40–150)
ALT SERPL W/O P-5'-P-CCNC: 13 UNIT/L (ref 10–44)
ANION GAP (OHS): 10 MMOL/L (ref 8–16)
AST SERPL-CCNC: 13 UNIT/L (ref 11–45)
BILIRUB SERPL-MCNC: 0.3 MG/DL (ref 0.1–1)
BUN SERPL-MCNC: 16 MG/DL (ref 6–20)
CALCIUM SERPL-MCNC: 9.7 MG/DL (ref 8.7–10.5)
CHLORIDE SERPL-SCNC: 106 MMOL/L (ref 95–110)
CHOLEST SERPL-MCNC: 198 MG/DL (ref 120–199)
CHOLEST/HDLC SERPL: 4.3 {RATIO} (ref 2–5)
CO2 SERPL-SCNC: 22 MMOL/L (ref 23–29)
CREAT SERPL-MCNC: 0.8 MG/DL (ref 0.5–1.4)
GFR SERPLBLD CREATININE-BSD FMLA CKD-EPI: >60 ML/MIN/1.73/M2
GLUCOSE SERPL-MCNC: 85 MG/DL (ref 70–110)
HDLC SERPL-MCNC: 46 MG/DL (ref 40–75)
HDLC SERPL: 23.2 % (ref 20–50)
IRON SATN MFR SERPL: 13 % (ref 20–50)
IRON SERPL-MCNC: 50 UG/DL (ref 30–160)
LDLC SERPL CALC-MCNC: 126 MG/DL (ref 63–159)
NONHDLC SERPL-MCNC: 152 MG/DL
POTASSIUM SERPL-SCNC: 4.3 MMOL/L (ref 3.5–5.1)
PROT SERPL-MCNC: 8.2 GM/DL (ref 6–8.4)
SODIUM SERPL-SCNC: 138 MMOL/L (ref 136–145)
TIBC SERPL-MCNC: 394 UG/DL (ref 250–450)
TRANSFERRIN SERPL-MCNC: 266 MG/DL (ref 200–375)
TRIGL SERPL-MCNC: 130 MG/DL (ref 30–150)

## 2025-04-07 PROCEDURE — 3075F SYST BP GE 130 - 139MM HG: CPT | Mod: CPTII,S$GLB,, | Performed by: STUDENT IN AN ORGANIZED HEALTH CARE EDUCATION/TRAINING PROGRAM

## 2025-04-07 PROCEDURE — 1160F RVW MEDS BY RX/DR IN RCRD: CPT | Mod: CPTII,S$GLB,, | Performed by: STUDENT IN AN ORGANIZED HEALTH CARE EDUCATION/TRAINING PROGRAM

## 2025-04-07 PROCEDURE — 85025 COMPLETE CBC W/AUTO DIFF WBC: CPT

## 2025-04-07 PROCEDURE — 3078F DIAST BP <80 MM HG: CPT | Mod: CPTII,S$GLB,, | Performed by: STUDENT IN AN ORGANIZED HEALTH CARE EDUCATION/TRAINING PROGRAM

## 2025-04-07 PROCEDURE — 36415 COLL VENOUS BLD VENIPUNCTURE: CPT

## 2025-04-07 PROCEDURE — 82374 ASSAY BLOOD CARBON DIOXIDE: CPT

## 2025-04-07 PROCEDURE — 3008F BODY MASS INDEX DOCD: CPT | Mod: CPTII,S$GLB,, | Performed by: STUDENT IN AN ORGANIZED HEALTH CARE EDUCATION/TRAINING PROGRAM

## 2025-04-07 PROCEDURE — 99395 PREV VISIT EST AGE 18-39: CPT | Mod: S$GLB,,, | Performed by: STUDENT IN AN ORGANIZED HEALTH CARE EDUCATION/TRAINING PROGRAM

## 2025-04-07 PROCEDURE — 84466 ASSAY OF TRANSFERRIN: CPT

## 2025-04-07 PROCEDURE — 82728 ASSAY OF FERRITIN: CPT

## 2025-04-07 PROCEDURE — 80061 LIPID PANEL: CPT

## 2025-04-07 PROCEDURE — 99999 PR PBB SHADOW E&M-EST. PATIENT-LVL III: CPT | Mod: PBBFAC,,, | Performed by: STUDENT IN AN ORGANIZED HEALTH CARE EDUCATION/TRAINING PROGRAM

## 2025-04-07 PROCEDURE — 1159F MED LIST DOCD IN RCRD: CPT | Mod: CPTII,S$GLB,, | Performed by: STUDENT IN AN ORGANIZED HEALTH CARE EDUCATION/TRAINING PROGRAM

## 2025-04-07 RX ORDER — SUMATRIPTAN SUCCINATE 100 MG/1
TABLET ORAL
Qty: 9 TABLET | Refills: 11 | Status: SHIPPED | OUTPATIENT
Start: 2025-04-07

## 2025-04-08 ENCOUNTER — PATIENT MESSAGE (OUTPATIENT)
Dept: INTERNAL MEDICINE | Facility: CLINIC | Age: 30
End: 2025-04-08
Payer: COMMERCIAL

## 2025-04-08 ENCOUNTER — RESULTS FOLLOW-UP (OUTPATIENT)
Dept: INTERNAL MEDICINE | Facility: CLINIC | Age: 30
End: 2025-04-08

## 2025-04-08 PROBLEM — N64.52 DISCHARGE FROM LEFT NIPPLE: Status: ACTIVE | Noted: 2025-04-08

## 2025-04-08 LAB
ABSOLUTE EOSINOPHIL (OHS): 0.01 K/UL
ABSOLUTE MONOCYTE (OHS): 0.76 K/UL (ref 0.3–1)
ABSOLUTE NEUTROPHIL COUNT (OHS): 8.35 K/UL (ref 1.8–7.7)
BASOPHILS # BLD AUTO: 0.03 K/UL
BASOPHILS NFR BLD AUTO: 0.2 %
ERYTHROCYTE [DISTWIDTH] IN BLOOD BY AUTOMATED COUNT: 12.2 % (ref 11.5–14.5)
FERRITIN SERPL-MCNC: 165 NG/ML (ref 20–300)
HCT VFR BLD AUTO: 46.9 % (ref 37–48.5)
HGB BLD-MCNC: 14.6 GM/DL (ref 12–16)
IMM GRANULOCYTES # BLD AUTO: 0.06 K/UL (ref 0–0.04)
IMM GRANULOCYTES NFR BLD AUTO: 0.5 % (ref 0–0.5)
LYMPHOCYTES # BLD AUTO: 2.86 K/UL (ref 1–4.8)
MCH RBC QN AUTO: 28.6 PG (ref 27–31)
MCHC RBC AUTO-ENTMCNC: 31.1 G/DL (ref 32–36)
MCV RBC AUTO: 92 FL (ref 82–98)
NUCLEATED RBC (/100WBC) (OHS): 0 /100 WBC
PLATELET # BLD AUTO: 423 K/UL (ref 150–450)
PMV BLD AUTO: 10.5 FL (ref 9.2–12.9)
RBC # BLD AUTO: 5.11 M/UL (ref 4–5.4)
RELATIVE EOSINOPHIL (OHS): 0.1 %
RELATIVE LYMPHOCYTE (OHS): 23.7 % (ref 18–48)
RELATIVE MONOCYTE (OHS): 6.3 % (ref 4–15)
RELATIVE NEUTROPHIL (OHS): 69.2 % (ref 38–73)
WBC # BLD AUTO: 12.07 K/UL (ref 3.9–12.7)

## 2025-04-08 NOTE — ASSESSMENT & PLAN NOTE
- Advised the patient to continue current sleep hygiene practices to avoid migraine triggers.  - Noted that the patient experiences migraines 2-3 times per month, with symptoms including photosensitivity and nausea.  - Identified stress, sleep deprivation, and barometric pressure changes as potential triggers.  - Observed that the patient experiences cervicalgia in posterior regions during migraines, with severity sometimes precluding driving.  - NSAIDs have not worked in the past, Imitrex works well  - Renewed prescription for Imitrex (sumatriptan) for abortive therapy, increasing to 9 tablets per fill with 11 refills to ensure consistent supply.  - Offered referral to a neurologist if needed.  - patient is requesting FMLA forms to be signed, to take off of work maybe 4 days a month, for intractable headache.  Patient to attach the FMLA forms in the portal.  She does report of taking off on 02/28 and was requesting a doctor's note for it.  Unfortunately I was not the patient's PCP at that time.  But I have offered to fill the FMLA forms.  -continue Flexeril 10 mg nightly as needed for neck pain

## 2025-04-08 NOTE — ASSESSMENT & PLAN NOTE
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   Ultrasound left breast unremarkable  Diagnostic Mammogram ordered but can not find results in the chart  Advised the patient to reach out to breast surgery clinic after nipple discharge is bothersome:  However she states that she is fine for now

## 2025-04-16 ENCOUNTER — PATIENT MESSAGE (OUTPATIENT)
Dept: INTERNAL MEDICINE | Facility: CLINIC | Age: 30
End: 2025-04-16
Payer: COMMERCIAL

## 2025-04-24 NOTE — TELEPHONE ENCOUNTER
April 24, 2025     Patient: Meg Henderson   YOB: 1965   Date of Visit: 4/24/2025       To Whom it May Concern:    Meg Henderson was seen in my clinic on 4/24/2025 at 3:20 pm. It is recommended that the patient take 1 week off of work due to current medical conditions. She is cleared to return on 5/4/2025 at this time.        Sincerely,     Dr Hetal Adames    Medical information is confidential and cannot be disclosed without the written consent of the patient or her representative.       Please see the attached refill request.

## 2025-04-25 ENCOUNTER — PATIENT MESSAGE (OUTPATIENT)
Dept: INTERNAL MEDICINE | Facility: CLINIC | Age: 30
End: 2025-04-25
Payer: COMMERCIAL

## 2025-07-22 DIAGNOSIS — G43.009 MIGRAINE WITHOUT AURA AND WITHOUT STATUS MIGRAINOSUS, NOT INTRACTABLE: ICD-10-CM

## 2025-07-22 RX ORDER — SUMATRIPTAN SUCCINATE 100 MG/1
TABLET ORAL
Qty: 27 TABLET | Refills: 0 | Status: SHIPPED | OUTPATIENT
Start: 2025-07-22

## 2025-07-22 NOTE — TELEPHONE ENCOUNTER
No care due was identified.  [facility] Embedded Care Due Messages. Reference number: [id number].   7/22/2025 12:05:03 PM CDT

## 2025-07-22 NOTE — TELEPHONE ENCOUNTER
Refill Decision Note   Anabel Santosh  is requesting a refill authorization.  Brief Assessment and Rationale for Refill:  Approve     Medication Therapy Plan:         Comments:     Note composed:5:33 PM 07/22/2025

## 2025-08-27 DIAGNOSIS — G43.009 MIGRAINE WITHOUT AURA AND WITHOUT STATUS MIGRAINOSUS, NOT INTRACTABLE: ICD-10-CM

## 2025-08-27 RX ORDER — SUMATRIPTAN SUCCINATE 100 MG/1
TABLET ORAL
Qty: 27 TABLET | Refills: 0 | Status: SHIPPED | OUTPATIENT
Start: 2025-08-27